# Patient Record
Sex: FEMALE | Race: WHITE
[De-identification: names, ages, dates, MRNs, and addresses within clinical notes are randomized per-mention and may not be internally consistent; named-entity substitution may affect disease eponyms.]

---

## 2019-11-26 ENCOUNTER — HOSPITAL ENCOUNTER (INPATIENT)
Dept: HOSPITAL 74 - JER | Age: 64
LOS: 3 days | Discharge: HOME | DRG: 271 | End: 2019-11-29
Attending: FAMILY MEDICINE | Admitting: INTERNAL MEDICINE
Payer: COMMERCIAL

## 2019-11-26 VITALS — BODY MASS INDEX: 33.9 KG/M2

## 2019-11-26 DIAGNOSIS — K76.89: ICD-10-CM

## 2019-11-26 DIAGNOSIS — F17.210: ICD-10-CM

## 2019-11-26 DIAGNOSIS — J90: ICD-10-CM

## 2019-11-26 DIAGNOSIS — N28.1: ICD-10-CM

## 2019-11-26 DIAGNOSIS — T78.40XA: ICD-10-CM

## 2019-11-26 DIAGNOSIS — J44.9: ICD-10-CM

## 2019-11-26 DIAGNOSIS — I31.4: Primary | ICD-10-CM

## 2019-11-26 DIAGNOSIS — Z85.828: ICD-10-CM

## 2019-11-26 DIAGNOSIS — R00.0: ICD-10-CM

## 2019-11-26 DIAGNOSIS — J98.11: ICD-10-CM

## 2019-11-26 DIAGNOSIS — R60.9: ICD-10-CM

## 2019-11-26 DIAGNOSIS — I31.3: ICD-10-CM

## 2019-11-26 LAB
ALBUMIN SERPL-MCNC: 3 G/DL (ref 3.4–5)
ALP SERPL-CCNC: 87 U/L (ref 45–117)
ALT SERPL-CCNC: 54 U/L (ref 13–61)
ANION GAP SERPL CALC-SCNC: 7 MMOL/L (ref 8–16)
APTT BLD: 27.9 SECONDS (ref 25.2–36.5)
AST SERPL-CCNC: 26 U/L (ref 15–37)
BASOPHILS # BLD: 1.2 % (ref 0–2)
BILIRUB SERPL-MCNC: 0.4 MG/DL (ref 0.2–1)
BUN SERPL-MCNC: 8.4 MG/DL (ref 7–18)
CALCIUM SERPL-MCNC: 8.6 MG/DL (ref 8.5–10.1)
CHLORIDE SERPL-SCNC: 100 MMOL/L (ref 98–107)
CO2 SERPL-SCNC: 26 MMOL/L (ref 21–32)
CREAT SERPL-MCNC: 0.5 MG/DL (ref 0.55–1.3)
DEPRECATED RDW RBC AUTO: 13.3 % (ref 11.6–15.6)
EOSINOPHIL # BLD: 1 % (ref 0–4.5)
GLUCOSE SERPL-MCNC: 105 MG/DL (ref 74–106)
HCT VFR BLD CALC: 36.8 % (ref 32.4–45.2)
HGB BLD-MCNC: 12.4 GM/DL (ref 10.7–15.3)
INR BLD: 1.34 (ref 0.83–1.09)
LYMPHOCYTES # BLD: 16.6 % (ref 8–40)
MCH RBC QN AUTO: 31.1 PG (ref 25.7–33.7)
MCHC RBC AUTO-ENTMCNC: 33.7 G/DL (ref 32–36)
MCV RBC: 92.4 FL (ref 80–96)
MONOCYTES # BLD AUTO: 9.6 % (ref 3.8–10.2)
NEUTROPHILS # BLD: 71.6 % (ref 42.8–82.8)
PLATELET # BLD AUTO: 285 K/MM3 (ref 134–434)
PMV BLD: 8.4 FL (ref 7.5–11.1)
POTASSIUM SERPLBLD-SCNC: 3.5 MMOL/L (ref 3.5–5.1)
PROT SERPL-MCNC: 5.7 G/DL (ref 6.4–8.2)
PT PNL PPP: 15.9 SEC (ref 9.7–13)
RBC # BLD AUTO: 3.99 M/MM3 (ref 3.6–5.2)
SODIUM SERPL-SCNC: 133 MMOL/L (ref 136–145)
WBC # BLD AUTO: 9.5 K/MM3 (ref 4–10)

## 2019-11-26 PROCEDURE — 0W9D00Z DRAINAGE OF PERICARDIAL CAVITY WITH DRAINAGE DEVICE, OPEN APPROACH: ICD-10-PCS | Performed by: THORACIC SURGERY (CARDIOTHORACIC VASCULAR SURGERY)

## 2019-11-26 NOTE — CON.CARD
Consult


Consult Specialty:: Cardiology


Referred by:: ER


Reason for Consultation:: pericardial effusion





- History of Present Illness


Chief Complaint: sinus congestion, sob


History of Present Illness: 


63 year old woman with a pmh of basal cell Ca came to ER with c/o sob, facial 

swelling, generalized itching runny nose, cough, sinus congestion for 3 days. 

she has been on amoxicillin at home. when she became itchy and had sob she 

became concerned for an allergic reaction to amoxicillin thus came to ER.


CTA chest done in ER showed large pericardial effusion and b/l pleural 

effusions.


Echo was done confirming large pericardial effusion with echocardiographic 

signs of tamponade.


pt currently comfortable, lying flat, nad. no current complaints. 





- History Source


History Provided By: Patient, Medical Record


Limitations to Obtaining History: No Limitations





- Smoking History


Smoking history: Unknown if ever smoked





Home Medications





- Allergies


Allergies/Adverse Reactions: 


 Allergies











Allergy/AdvReac Type Severity Reaction Status Date / Time


 


No Known Allergies Allergy   Verified 11/26/19 11:43














- Home Medications


Home Medications: 


Ambulatory Orders





Amoxicillin/Potassium Clav [Amox-Clav 875-125 mg Tablet] 1 mg PO BID 11/26/19 











Family Medical History


Family History: Denies





Review of Systems





- Review of Systems


Constitutional: denies: No Symptoms, Chills, Diaphoresis, Fever, Lethargy, Loss 

of Appetite, Malaise, Night Sweats, Unintentional Wgt. Loss, Weakness, Other


Eyes: denies: No Symptoms, Blind Spots, Blurred Vision, Double Vision, Eye Pain

, Floaters, Photophobia, Recent Change in Vision, Other


HENT: reports: Nasal Congestion, Throat Pain.  denies: No Symptoms, Difficult 

Swallowing, Ear Discharge, Ear Pain, Epistaxis, Gingival Bleeding, Hearing Loss

, Mouth Swelling, Ocular Prosthesis, Toothache, Ringing in Ears, Other


Neck: denies: No Symptoms, Decreased ROM, Lumps, Pain on Movement, Stiffness, 

Swollen Glands, Tenderness, Other


Cardiovascular: reports: Shortness of Breath.  denies: No Symptoms, Chest Pain, 

Edema, Palpitations, Other


Respiratory: reports: Cough, SOB, SOB on Exertion.  denies: No Symptoms, 

Exercise Intolerance, Hemoptysis, Orthopnea, PND, Snoring, Wheezing, Other


Gastrointestinal: denies: No Symptoms, Abdominal Pain, Bloating, Constipation, 

Diarrhea, Dysphagia, Indigestion, Melena, Nausea, Rectal Bleeding, Vomiting, 

Vomiting Blood, Other


Genitourinary: denies: No Symptoms, Burning, Discharge, Dysuria, Flank Pain, 

Frequency, Hematuria, Incontinence, Lesions, Menses, Pain, Testicular Mass, 

Testicular Pain, Testicular Swelling, Urgency, Vaginal Bleeding, Other


Breasts: denies: No Symptoms Reported, See HPI, Breast Implants, Discharge from 

Nipple, Lumps, Pain, Skin Changes, Other


Musculoskeletal: denies: No Symptoms, Back Pain, Crepitus, Decreased ROM, 

Extremity Pain, Joint Pain, Joint Swelling, Muscle Pain, Muscle Cramps, Muscle 

Weakness, Other


Integumentary: denies: No Symptoms, Blister, Bruising, Change in Color, Eczema, 

Erythema, Incision, Lesions, Lump, Pallor, Pruritis, Rash, Wound, Other


Neurological: denies: No Symptoms, Change in LOC, Change in Speech, Confusion, 

Dizziness, Headache, Incoordination, Numbness, Parasthesia, Pre-Existing Deficit

, Seizure, Syncope, Tremors, Unsteady Gait, Weakness, Other


Endocrine: denies: No Symptoms, Excessive Sweating, Flushing, Increased Hunger, 

Increased Thirst, Intolerance to Cold, Intolerance to Heat, Unexplained Weight 

Gain, Unexplained Weight Loss, Other


Hematology/Lymphatic: denies: No Symptoms, Easily Bruised, Excessive Bleeding, 

Swollen Glands, Other


Psychiatric: denies: No Symptoms, Altered Sleep Pattern, Anxiety, Depression, 

Hallucinations, Panic, Paranoia, Suicidal, Other


Vital Signs: 


 Vital Signs











Temperature  98.2 F   11/26/19 11:31


 


Pulse Rate  117 H  11/26/19 11:31


 


Respiratory Rate  18   11/26/19 11:31


 


Blood Pressure  127/81   11/26/19 11:31


 


O2 Sat by Pulse Oximetry (%)  95   11/26/19 11:31











Constitutional: Yes: No Distress, Calm


Eyes: Yes: Conjunctiva Clear, EOM Intact


HENT: Yes: Atraumatic, Normocephalic


Neck: Yes: Supple, Trachea Midline


Respiratory: Yes: Regular, Diminished.  No: Rales, Rhonchi, SOB, Wheezes


Gastrointestinal: Yes: Normal Bowel Sounds, Soft.  No: Distention, Tenderness


Cardiovascular: Yes: Tachycardia.  No: Bradycardia, Pulse Irregular, Gallop, Rub

, Varicosities


JVD: No


Carotid Bruit: No


PMI: Non-Displaced


Heart Sounds: Yes: S1, S2.  No: Split S2, S3, S4, Clicks, Gallop, Rub, Bruit


Murmur: No: Systolic Murmur, Diastolic Murmur


Musculoskeletal: Yes: WNL


Extremities: Yes: WNL


Edema: No


Peripheral Pulses WNL: Yes


Peripheral Pulses: 2+ Left Doralis Pedis, 2+ Right Dorsalis Pedis


Neurological: Yes: Alert, Oriented


Psychiatric: Yes: Alert, Oriented





- Other Data


Labs, Other Data: 


 CBC, BMP





 11/26/19 12:44 





 11/26/19 12:44 








sinus tach





Echo: Report Reviewed, Image Reviewed





Imaging





- Results


Chest X-ray: Report Reviewed, Image Reviewed


EKG: Report Reviewed, Image Reviewed


Other: Report Reviewed, Image Reviewed





Assessment/Plan


63 year old woman with a pmh of basal cell Ca came to ER with c/o sob, facial 

swelling, generalized itching runny nose, cough, sinus congestion for 3 days. 

she has been on amoxicillin at home. when she became itchy and had sob she 

became concerned for an allergic reaction to amoxicillin thus came to ER.


CTA chest done in ER showed large pericardial effusion and b/l pleural 

effusions.


Echo was done confirming large pericardial effusion with echocardiographic 

signs of tamponade.


pt currently comfortable, lying flat, nad. no current complaints. 





Pericardial effusion-with evidence of tamponade


-BP stable


-mildly symptomatic


-ekg sinus tach


-thoracic surgery and IR to be contacted for pericardiocentesis


-if unable to be performed here today recommend transfer to tertiary care 

center for either pericardiocentesis today or closer monitoring overnight for 

procedure tomorrow.


-discussed with pt transfer to [facility] but she declined stating that her insurance 

is not accepted there.


-avoid diuretics


-maintain adequate intravascular volume


-no anti-htn or AV sandra blocker meds

## 2019-11-26 NOTE — PDOC
History of Present Illness





- General


Chief Complaint: Allergic Reaction


Stated Complaint: Allergic Reaction


Time Seen by Provider: 11/26/19 12:06





- History of Present Illness


Initial Comments: 





Ms. Barron is a 64 y/o female with PMH of basal cell carcinoma presenting today 

with difficulty breathing, facial swelling, and generalized itchiness over the 

past 3 days. Reports that she has been feeling sick since Saturday (runny nose, 

cough) and was diagnosed with sinusitis and given amoxicillin. She has taken 

amoxicillin since Sunday. Reports that since Sunday she has felt short of 

breath and felt her cheeks swell and generalized itchiness. She does have 

eczema. After the itchiness and difficulty breathing did not improve, she was 

concerned about an allergic reaction and presented to the ED. 





Denies chest pain. Denies throat swelling. Denies tongue swelling. Denies 

abdominal pain.





SocHx: 20 pack year smoking hx








Past History





- Past Medical History


Allergies/Adverse Reactions: 


 Allergies











Allergy/AdvReac Type Severity Reaction Status Date / Time


 


amoxicillin Allergy   Verified 11/26/19 19:31











Home Medications: 


Ambulatory Orders





Amoxicillin/Potassium Clav [Amox-Clav 875-125 mg Tablet] 1 mg PO BID 11/26/19 








COPD: No


Other medical history: SKIN CANCE WITH MULTIPLE MOHL'S PROCEDURES





- Psycho Social/Smoking Cessation Hx


Smoking History: Unknown if ever smoked


Drug/Substance Use Hx: No





**Review of Systems





- Review of Systems


Comments:: 





GENERAL/CONSTITUTIONAL: No fever or chills. No weakness._


HEAD, EYES, EARS, NOSE AND THROAT: No change in vision. No change in hearing. 

No sore throat. Reports swelling of the cheeks.


CARDIOVASCULAR: No chest pain. Reports difficulty breathing. 


RESPIRATORY: Reports cough. No hemoptysis_


GASTROINTESTINAL: No nausea, vomiting, diarrhea or constipation._


GENITOURINARY: No dysuria, frequency, or change in urination._


MUSCULOSKELETAL: No joint or muscle swelling or pain. No neck or back pain._


SKIN: Reports diffuse itching and dry skin. 


NEUROLOGIC: No headache, vertigo, loss of consciousness, or change in strength/

sensation._


ENDOCRINE: No increased thirst. No abnormal weight change_


HEMATOLOGIC/LYMPHATIC: No anemia, easy bleeding, or history of blood clots._








*Physical Exam





- Vital Signs


 Last Vital Signs











Temp Pulse Resp BP Pulse Ox


 


 98.2 F   117 H  18   127/81   95 


 


 11/26/19 11:31  11/26/19 11:31  11/26/19 11:31  11/26/19 11:31  11/26/19 11:31














- Physical Exam


Comments: 





GENERAL: Awake, alert, and oriented to person/place/time, in no acute distress_


HEAD: No signs of trauma, normocephalic, atraumatic _


EYES: PERRLA, EOMI, sclera anicteric, conjunctiva clear_


ENT: Hearing grossly normal, nares patent, oropharynx clear without exudates. 

No uvular deviation. Moist mucosa. Tongue and oral cavity do not appear 

swollen. 


NECK: Normal ROM, supple, no lymphadenopathy, JVD, or masses_


LUNGS: No distress, speaks in full sentences, clear to auscultation bilaterally 

_


HEART: Regular rate and rhythm, normal S1 and S2, no murmurs appreciated, 

peripheral pulses normal and equal bilaterally._


ABDOMEN: Soft, nontender, normoactive bowel sounds. No guarding, no rebound. No 

masses_


EXTREMITIES: Normal inspection, Normal range of motion. Mild 1+ pitting edema 

in lower extremities. No clubbing or cyanosis_


NEUROLOGICAL: Cranial nerves II through XII grossly intact. Normal speech, 

normal gait, no focal sensorimotor deficits _


SKIN: Warm, Dry, normal turgor. Dry skin. No rash or hives noted. 











ED Treatment Course





- LABORATORY


CBC & Chemistry Diagram: 


 11/26/19 12:44





 11/26/19 12:44





Medical Decision Making





- Medical Decision Making





11/26/19 12:30


63F presenting with facial swelling and difficulty breathing over the past 3 

days. Given amoxicillin for sinusitis. Benadryl given by EMS. 


DDx includes allergic reaction vs sinusitis vs viral URI.


-cbc, cmp


-cxr


-duonebs, solumedrol 





11/26/19 14:41


Labs reviewed. WBC wnl. 


CXR shows left pleural effusion.


-CTA chest to r/o PE vs PNA





11/26/19 16:03


CTA shows large pericardial effusion. VSS.


-echo to r/o cardiac tamponade 


-cards consult 





11/26/19 17:08


Echo shows large pericardial effusion with evidence of cardiac tamponade 


EKG shows sinus tachycardia, 113 bpm, low voltage, QTc 400. 


D/w Dr. Hakami from thoracic surgery, who will place a drain tonight. 


Pt reassessed. /87.


-2L NS


-T+S, coags, trop, BNP





11/26/19 17:00


Labs reviewed. 


 Laboratory Tests











  11/26/19 11/26/19 11/26/19





  12:44 12:44 16:40


 


WBC  9.5  


 


RBC  3.99  


 


Hgb  12.4  


 


Hct  36.8  


 


MCV  92.4  


 


MCH  31.1  


 


MCHC  33.7  


 


RDW  13.3  


 


Plt Count  285  


 


MPV  8.4  


 


Absolute Neuts (auto)  6.8  


 


Neutrophils %  71.6  


 


Lymphocytes %  16.6  


 


Monocytes %  9.6  


 


Eosinophils %  1.0  


 


Basophils %  1.2  


 


Nucleated RBC %  0  


 


PT with INR   


 


INR   


 


PTT (Actin FS)   


 


Sodium   133 L 


 


Potassium   3.5 


 


Chloride   100 


 


Carbon Dioxide   26 


 


Anion Gap   7 L 


 


BUN   8.4 


 


Creatinine   0.5 L 


 


Est GFR (CKD-EPI)AfAm   118.54 


 


Est GFR (CKD-EPI)NonAf   102.28 


 


Random Glucose   105 


 


Calcium   8.6 


 


Total Bilirubin   0.4 


 


AST   26 


 


ALT   54 


 


Alkaline Phosphatase   87 


 


Troponin I   


 


B-Natriuretic Peptide    436.4 H


 


Total Protein   5.7 L 


 


Albumin   3.0 L 


 


Blood Type   


 


Antibody Screen   














  11/26/19 11/26/19 11/26/19





  16:40 16:40 16:40


 


WBC   


 


RBC   


 


Hgb   


 


Hct   


 


MCV   


 


MCH   


 


MCHC   


 


RDW   


 


Plt Count   


 


MPV   


 


Absolute Neuts (auto)   


 


Neutrophils %   


 


Lymphocytes %   


 


Monocytes %   


 


Eosinophils %   


 


Basophils %   


 


Nucleated RBC %   


 


PT with INR  15.90 H  


 


INR  1.34 H  


 


PTT (Actin FS)  27.9  


 


Sodium   


 


Potassium   


 


Chloride   


 


Carbon Dioxide   


 


Anion Gap   


 


BUN   


 


Creatinine   


 


Est GFR (CKD-EPI)AfAm   


 


Est GFR (CKD-EPI)NonAf   


 


Random Glucose   


 


Calcium   


 


Total Bilirubin   


 


AST   


 


ALT   


 


Alkaline Phosphatase   


 


Troponin I   < 0.02 


 


B-Natriuretic Peptide   


 


Total Protein   


 


Albumin   


 


Blood Type    O POSITIVE


 


Antibody Screen    Negative














11/26/19 18:23


D/w hospitalist team who accepts the patient for admission to the ICU after 

procedure. 














Discharge





- Discharge Information


Problems reviewed: Yes


Clinical Impression/Diagnosis: 


 Cardiac tamponade





Condition: Guarded





- Admission


Yes





- Follow up/Referral





- Patient Discharge Instructions





- Post Discharge Activity

## 2019-11-26 NOTE — ECHO
______________________________________________________________________________



Name: SANDER RAHMAN                                   Exam:Adult Echocardiogram

MRN: [id number]         Study Date: 11/26/2019 03:54 PM

Age: 63 yrs

______________________________________________________________________________



Reason For Study: R/O Tamponade Large Effusion

Height: 66 in        Weight: 160 lb        BSA: 1.8 m2



______________________________________________________________________________





______________________________________________________________________________

Procedure

The study was technically limited with all images being suboptimal in quality. A two-dimensional ge
sthoracic

echocardiogram with color flow and Doppler was performed in limited views only. No measurements obtai
gage.

Left Ventricle

The left ventricle is grossly normal size. Left ventricular systolic function is grossly normal.

Right Ventricle

The right ventricle is not well visualized. RV collapse in diastole noted.

Atria

RA collapse in diastole noted.

Mitral Valve

The mitral valve is not well visualized.

Tricuspid Valve

The tricuspid valve is not well visualized.

Aortic Valve

The aortic valve is not well visualized.

Pulmonic Valve

The pulmonic valve is not well visualized.

Great Vessels

The aortic root is not well visualized.

Pericardium/Pleura

Large pericardial effusion (>2 cm). The diastolic compression of the right ventricle is suggestive of
 cardiac

tamponade.



______________________________________________________________________________



Interpretation Summary

The study was technically limited with all images being suboptimal in quality. A two-dimensional ge
sthoracic

echocardiogram with color flow and Doppler was performed in limited views only. No measurements obtai
gage.



The left ventricle is grossly normal size. Left ventricular systolic function is grossly normal.

RV collapse in diastole noted. RA collapse in diastole noted.

Large pericardial effusion (>2 cm) The diastolic compression of the right ventricle is suggestive of 
cardiac

tamponade.





MD Tanesha Sewell 11/26/2019 04:43 PM

## 2019-11-26 NOTE — CONSULT
Consult


Consult Specialty:: thoracic





- History of Present Illness


Chief Complaint: shortness of breath


History of Present Illness: 





2 weeks history of malaise and sob. was admitted to hospital where CT chest and 

TTE revealed large pericardial effusion and tamponade.





- History Source


History Provided By: Patient





- Past Medical History


CNS: No: Alzheimer's, CVA, Dementia, Migraine, Multiple Sclerosis, Peripheral 

Neuropathy, Parkinson's, Seizure, Syncope, TIA, Vertigo, Other


Cardio/Vascular: No: AFIB, Aneurysm, Aortic Insufficiency, Aortic Stenosis, CAD

, CHF, Deep Vein Thrombosis, HTN, Hyperlipdemia, MI, Mitral Insufficiency, 

Mitral Stenosis, Murmur, Pulmonary Hypertension, Other


Gastrointestinal: No: Ascites, Cancer, Constipation, Crohn's Disease, 

Diverticulitis, Diverticulosis, Esophageal Varices, Gastritis, GERD, GI Bleed, 

Hemorrhoids, Hiatal Hernia, Inflamatory Bowel Disease, Irritable Bowel Disease, 

Pancreatitis, Peptic Ulcer Disease, Ulcerative Colitis, Other


Hepatobiliary: No: Cirrhosis, Cholelithiasis, Cholecystitis, Choledocholithiasis

, Hepatitis A, Hepatitis B, Hepatitis C, Other


Rheumatology: Yes: Rheumatoid Arthritis





- Past Surgical History


Additional Surgical History: removal of skin cancer from face





- Alcohol/Substance Use


Hx Alcohol Use: No





- Smoking History


Smoking history: Never smoked





Home Medications





- Allergies


Allergies/Adverse Reactions: 


 Allergies











Allergy/AdvReac Type Severity Reaction Status Date / Time


 


amoxicillin Allergy   Verified 11/26/19 19:31














- Home Medications


Home Medications: 


Ambulatory Orders





Amoxicillin/Potassium Clav [Amox-Clav 875-125 mg Tablet] 1 mg PO BID 11/26/19 











Family Medical History


Family History: Unremarkable





Review of Systems





- Review of Systems


Constitutional: reports: Lethargy


Eyes: reports: No Symptoms


HENT: denies: No Symptoms, Difficult Swallowing, Ear Discharge, Ear Pain, 

Epistaxis, Gingival Bleeding, Hearing Loss, Mouth Swelling, Nasal Congestion, 

Ocular Prosthesis, Throat Pain, Toothache, Ringing in Ears, Other


Neck: denies: No Symptoms, Decreased ROM, Lumps, Pain on Movement, Stiffness, 

Swollen Glands, Tenderness, Other


Respiratory: reports: Cough, SOB


Genitourinary: denies: No Symptoms, Burning, Discharge, Dysuria, Flank Pain, 

Frequency, Hematuria, Incontinence, Lesions, Menses, Pain, Testicular Mass, 

Testicular Pain, Testicular Swelling, Urgency, Vaginal Bleeding, Other


Breasts: denies: No Symptoms Reported, See HPI, Breast Implants, Discharge from 

Nipple, Lumps, Pain, Skin Changes, Other


Musculoskeletal: denies: No Symptoms, Back Pain, Crepitus, Decreased ROM, 

Extremity Pain, Joint Pain, Joint Swelling, Muscle Pain, Muscle Cramps, Muscle 

Weakness, Other


Integumentary: denies: No Symptoms, Blister, Bruising, Change in Color, Eczema, 

Erythema, Incision, Lesions, Lump, Pallor, Pruritis, Rash, Wound, Other


Neurological: denies: No Symptoms, Change in LOC, Change in Speech, Confusion, 

Dizziness, Headache, Incoordination, Numbness, Parasthesia, Pre-Existing Deficit

, Seizure, Syncope, Tremors, Unsteady Gait, Weakness, Other





Physical Exam


Vital Signs: 


 Vital Signs











Temperature  97.6 F   11/26/19 22:20


 


Pulse Rate  108 H  11/26/19 23:05


 


Respiratory Rate  16   11/26/19 23:05


 


Blood Pressure  117/68   11/26/19 23:05


 


O2 Sat by Pulse Oximetry (%)  94 L  11/26/19 23:05











Constitutional: Yes: Well Nourished


Eyes: Yes: WNL


HENT: Yes: WNL


Neck: Yes: Supple, Trachea Midline


Respiratory: Yes: Cough, Tachypnea


Peripheral Pulses WNL: No


Labs: 


 CBC, BMP





 11/26/19 12:44 





 11/26/19 12:44 











Imaging





- Results


Chest X-ray: Report Reviewed


Cat Scan: Report Reviewed (TTE reviewed)





Problem List





- Problems


(1) Cardiac tamponade


Code(s): I31.4 - CARDIAC TAMPONADE   





(2) Pericardial effusion


Code(s): I31.3 - PERICARDIAL EFFUSION (NONINFLAMMATORY)   





Assessment/Plan





63 y/o F with pericardial effusion and tamponade sign on TTE  was admitted to 

the hospital' There is indication for emergency subxiphoid pericardial window.


Patient was informed of risks, benefits and alternative treatment options and 

consented for surgery.

## 2019-11-26 NOTE — PDOC
Documentation entered by Joaquin Carrizales SCRIBE, acting as scribe for Randy Domingo MD.








Randy Domingo MD:  This documentation has been prepared by the All king Nirvannie, SCRIBE, under my direction and personally reviewed by me in its 

entirety.  I confirm that the documentation accurately reflects all work, 

treatment, procedures, and medical decision making performed by me.  





Attending Attestation





- Resident


Resident Name: Marin Sarmiento





- ED Attending Attestation


I have performed the following: I have examined & evaluated the patient, The 

case was reviewed & discussed with the resident, I agree w/resident's findings 

& plan, Exceptions are as noted





- HPI


HPI: 





11/26/19 13:31


CC: Difficulty swelling, generalized itchiness, and facial swelling.


 


HPI:





The patient is a 63 year old female, with a significant past medical history of 

basal cell carcinoma, who presents to the emergency department with, 3 days of 

difficulty sinusitis rhinorrhea nasal congestion cough and shortness of breath 

was seen in urgent care was prescribed amoxicillin today developed itching with 

no significant improvement in her other symptoms and presents to the ED for 

further management





Symptoms are moderate persistent constant with no exacerbating relieving 

factors.  Her blood pressure upon arrival to the emergency department was 

within normal limits she was slightly tachycardic


She denies any tongue/throat swelling or chest pain. 





Allergies: NKDA 


Primary Care Physician: Dr. Shaffer





11/26/19 17:30








- Physicial Exam


PE: 





11/26/19 17:31








Vitals: Triage Vital signs reviewed


General Appearance: No acute distress, well nourished well developed, 


Head: Atraumatic, 


Cardiac: Tachycardic


Lungs: Clear to auscultation bilateral, good air movement bilaterally,


Abdomen: Soft, non distended, normal bowel sounds, non tender to palpation


Genitourinary:


Rectal: Exam deferred


Extremities: Full range of motion to all extremities, no cyanosis, clubbing, or 

edema


Skin: Warm and dry, no rashes or lesions, no rash, no petechiae


Neuro: AOX3; cranial Nerves 2-12 grossly intact, strength intact to all 

extremities, sensation intact to all extremities, gait normal


Psych: Normal mood, normal affect








- Critical Care Time


Total Critical Care Time: 65


Critical Care Statement: The care of this patient involved high complexity 

decision making to prevent further life threatening deterioration of the patient

's condition and/or to evaluate & treat vital organ system(s) failure or risk 

of failure.





- Medical Decision Making





11/26/19 18:08


63 years old no significant past medical history active tobacco but decreasing 

usage presents to the ED with 3-day history of sinus pain congestion started on 

amoxicillin on Friday with mild itching today no rash consistent with mild 

allergic reaction





Chest x-ray ordered patient treated with Solu-Medrol duo nebs will observe and 

reassess





Reevaluation patient feels much better after Solu-Medrol and duo nebs her chest 

x-ray demonstrated a left pleural effusion no previous to compare previous 

chart review performed





Given tachycardia and pleural effusion we will perform a CTA to better 

delineate etiology of effusion and rule out PE





Reevaluation CTA demonstrates large pericardial effusion repeat vitals checked 

patient systolic blood pressure greater than 120





Cardiology consulted stat echo ordered





Echo demonstrated large pericardial effusion with evidence of tamponade 

physiology





EKG demonstrates low voltage 2 L normal saline ordered





Case discussed with  Thoracic surgery (interventional not available)





Plan is for drain tonight in the OR patient has been n.p.o. since yesterday not 

on any blood thinners





No past medical history medically clear for drain











**Heart Score/ECG Review





- ECG Impressions


Comment:: 





11/26/19 18:08


Low voltage EKG





Interpreted by me.

## 2019-11-26 NOTE — CONSULT
Consultation: 


REQUESTING PROVIDER:





CONSULT REQUEST: We have been asked to medically evaluate this patient for (

specify).





HISTORY OF PRESENT ILLNESS:


This is a 63 year old female with PMH significant for basal cell CA. She 

presented to the ER with complaints of diffuse swelling of her hands, arms, face

, and legs associated with difficulty breathing and nasal congestion for the 

past 3 days. She visited an urgent care clinic 3 days ago for nasal congestion, 

which she attributed to seasonal allergies. She was prescribed Amoxicillin 

twice a day, and has had a total of 5 doses so far. She has no history of 

penicillin allergy. She was diagnosed with bronchitis in mid September, and 

took antibiotics twice a day for 10 days, although she does not remember which 

antibiotics she took.





She does not complain of any fevers, chills, nausea, vomiting, diarrhea, 

constipation, chest pain, palpitations, dysuria, polyuria, hematuria,  





She occasionally takes antihistamined for seasonal allergies, but does not take 

any regular medication. She had back surgery 47 years ago for I&D of an 

abscess. She smokes 3 cigarettes per day, down from 15 cigarettes per day for 

the past 40 years. She drinks alcohol socially, and does not use recreational 

drugs.





CTA Chest was performed to r/o PE, and an enlarged heart with significant 

pericardial effusion. Echo was performed which confirmed tamponade, pending 

pericardiocentisis followed by transfer to ICU for monitoring. 








REVIEW OF SYSTEMS:


CONSTITUTIONAL: 


Absent:  fever, chills, diaphoresis, generalized weakness, malaise, loss of 

appetite, weight change


HEENT: 


Absent:  rhinorrhea, nasal congestion, throat pain, throat swelling, difficulty 

swallowing, mouth swelling, ear pain, eye pain, visual changes


CARDIOVASCULAR: 


Absent: chest pain, syncope, palpitations, irregular heart rate, lightheadedness

, peripheral edema


RESPIRATORY: SOB


Absent: cough, dyspnea with exertion, orthopnea, wheezing, stridor, hemoptysis


GASTROINTESTINAL:


Absent: abdominal pain, abdominal distension, nausea, vomiting, diarrhea, 

constipation, melena, hematochezia


GENITOURINARY: 


Absent: dysuria, frequency, urgency, hesitancy, hematuria, flank pain, genital 

pain


MUSCULOSKELETAL: 


Absent: myalgia, arthralgia, joint swelling, back pain, neck pain


SKIN: 


Absent: rash, itching, pallor


HEMATOLOGIC/IMMUNOLOGIC: 


Absent: easy bleeding, easy bruising, lymphadenopathy, frequent infections


ENDOCRINE:


Absent: unexplained weight gain, unexplained weight loss, heat intolerance, 

cold intolerance


NEUROLOGIC: 


Absent: headache, focal weakness or paresthesias, dizziness, unsteady gait, 

seizure, mental status changes, bladder or bowel incontinence


PSYCHIATRIC: 


Absent: anxiety, depression, suicidal or homicidal ideation, hallucinations.





PHYSICAL EXAMINATION





 Last Vital Signs











Temp Pulse Resp BP Pulse Ox


 


 97.4 F L  107 H  20   110/81   96 


 


 11/26/19 15:43  11/26/19 17:58  11/26/19 17:58  11/26/19 17:58  11/26/19 15:43

















GENERAL: AOx3


HEAD: Normal with no signs of trauma.


EYES: JOELLEN, EOMI


EARS, NOSE, THROAT: Ears normal, nares patent, oropharynx clear without 

exudates. Moist mucous membranes.


LUNGS: Breath sounds equal, clear to auscultation bilaterally. No wheezes, and 

no crackles. No accessory muscle use.


HEART: RRR, diminished heart sounds, no rubs


ABDOMEN: Soft, nontender, not distended, normoactive bowel sounds, no guarding, 

no rebound, no masses.  No hepatomegaly or splenomegaly. 


LOWER EXTREMITIES: 2+ pulses, no edema, no calf tenderness


NEUROLOGICAL:  Cranial nerves II-XII intact. Normal speech. Normal gait.











 CBC, BMP





 11/26/19 12:44 





 11/26/19 12:44 











ASSESSMENT/PLAN:


This is a 63 year old female with PMH significant for basal cell CA. She 

presented to the ER with complaints of diffuse swelling of her hands, arms, face

, and legs associated with difficulty breathing and nasal congestion for the 

past 3 days. CTA Chest was performed to r/o PE, and an enlarged heart with 

significant pericardial effusion. Echo was performed which confirmed tamponade, 

pending pericardiocentisis followed by transfer to ICU for monitoring. 





#Neuro


- AOx3





#CVS


- CT: Large pericardial effusion, B/L pleural effusions with lower lobe 

atelectasis, moderate COPD, multiple hepatic/renal cysts


- Echo: Large pericardial effusion, (>2cm), diastolic compression  of RV 

collapse due to tamponade, LV size and function normal


- .4





#Respiratory


- Not complaining of SOB


- Developed a productive cough over night, CXR ordered for AM, will check for 

fluid overload





#GI


- Will advance diet





 #Renal


- BUN/Cr 8.4/0.5





#FEN


- LR @ 125


- Na 133


- NPO





#Prophylaxis


- SCDs





#Dispo


- Will monitor in ICU


- We will continue to follow the patient. Thank you for this consultative 

opportunity.











ATTENDING PHYSICIAN STATEMENT





I saw and evaluated the patient.


I reviewed the resident's note and discussed the case with the resident.


I agree with the resident's findings and plan as documented.








SUBJECTIVE:








OBJECTIVE:








ASSESSMENT AND PLAN:

## 2019-11-26 NOTE — HP
Admitting History and Physical





- Primary Care Physician


PCP: Janay Shaffer





- Admission


Chief Complaint: Cough, SOB, Generalized Swelling, Rash, Pruritus


History of Present Illness: 





This is a 63 year old woman with a PMHx of Basal Cell Ca, Tobacco Smoker. Who 

presents to the ED with SOB x 4 days, facial swelling, generalized itching, 

runny nose, cough, sinus congestion for 3 days. She has been on Amoxicillin at 

home (from visit to Urgent Care last Sunday), when she developed pruritus, 

erythematous macular, papular rash and was SOB. She became concerned for an 

Allergic Reaction to Amoxicillin prompting her to seek evaluation in the ED. 

Patient was taken to the OR with Dr. Sanders- Pericardiocentesis





ED course was noted for:





(1) CTA chest done in ED- showed large Pericardial Effusion and b/l Pleural 

Effusions.


(2) Echo was done confirming large pericardial effusion with echocardiographic 

signs of Tamponade.





History Source: Patient


Limitations to Obtaining History: No Limitations





- Past Medical History


Dermatology: Yes: Basal Cell





- Past Surgical History


Additional Past Surgical History: 





Lumbar- I&D Abscess





- Smoking History


Smoking history: Current every day smoker


Aproximately how many cigarettes per day: 3





- Alcohol/Substance Use


Hx Alcohol Use: Yes (Occasional)


History of Substance Use: reports: None





- Social History


Usual Living Arrangement: Yes: Alone


ADL: Independent


History of Recent Travel: No





Home Medications





- Allergies


Allergies/Adverse Reactions: 


 Allergies











Allergy/AdvReac Type Severity Reaction Status Date / Time


 


amoxicillin Allergy   Verified 11/26/19 19:31














- Home Medications


Home Medications: 


Ambulatory Orders





Amoxicillin/Potassium Clav [Amox-Clav 875-125 mg Tablet] 1 mg PO BID 11/26/19 











Family Medical History


Family History: Unable to Obtain





Review of Systems





- Review of Systems


Constitutional: reports: Other (generalized body swelling)


Eyes: reports: No Symptoms


HENT: reports: Nasal Congestion


Neck: reports: No Symptoms


Cardiovascular: reports: Shortness of Breath


Respiratory: reports: Cough, SOB, SOB on Exertion, Wheezing


Gastrointestinal: reports: No Symptoms


Genitourinary: reports: No Symptoms


Breasts: reports: No Symptoms Reported


Musculoskeletal: reports: No Symptoms


Integumentary: reports: Erythema, Pruritis, Rash


Neurological: reports: No Symptoms


Endocrine: reports: No Symptoms


Hematology/Lymphatic: reports: No Symptoms


Psychiatric: reports: No Symptoms





Physical Examination


Vital Signs: 


 Vital Signs











Temperature  97.4 F L  11/26/19 15:43


 


Pulse Rate  107 H  11/26/19 17:58


 


Respiratory Rate  20   11/26/19 17:58


 


Blood Pressure  110/81   11/26/19 17:58


 


O2 Sat by Pulse Oximetry (%)  96   11/26/19 15:43











Constitutional: Yes: No Distress, Calm


Eyes: Yes: WNL, Conjunctiva Clear, EOM Intact, PERRL


HENT: Yes: WNL, Atraumatic, Normocephalic


Neck: Yes: WNL, Supple, Trachea Midline


Cardiovascular: Yes: Tachycardia, S1, S2


Respiratory: Yes: Diminished


Gastrointestinal: Yes: WNL, Normal Bowel Sounds, Soft


Renal/: Yes: WNL


Breast(s): Yes: WNL


Musculoskeletal: Yes: WNL


Extremities: Yes: WNL


Edema: No


Peripheral Pulses WNL: Yes


Neurological: Yes: WNL, Alert, Oriented, Cran Nerves II-XII Intact


...Motor Strength: WNL


Psychiatric: Yes: WNL, Alert, Oriented


Labs: 


 CBC, BMP





 11/26/19 12:44 





 11/26/19 12:44 





 Laboratory Results - last 24 hr











  11/26/19 11/26/19 11/26/19





  12:44 12:44 16:40


 


WBC  9.5  


 


RBC  3.99  


 


Hgb  12.4  


 


Hct  36.8  


 


MCV  92.4  


 


MCH  31.1  


 


MCHC  33.7  


 


RDW  13.3  


 


Plt Count  285  


 


MPV  8.4  


 


Absolute Neuts (auto)  6.8  


 


Neutrophils %  71.6  


 


Lymphocytes %  16.6  


 


Monocytes %  9.6  


 


Eosinophils %  1.0  


 


Basophils %  1.2  


 


Nucleated RBC %  0  


 


PT with INR   


 


INR   


 


PTT (Actin FS)   


 


Sodium   133 L 


 


Potassium   3.5 


 


Chloride   100 


 


Carbon Dioxide   26 


 


Anion Gap   7 L 


 


BUN   8.4 


 


Creatinine   0.5 L 


 


Est GFR (CKD-EPI)AfAm   118.54 


 


Est GFR (CKD-EPI)NonAf   102.28 


 


Random Glucose   105 


 


Calcium   8.6 


 


Phosphorus   


 


Magnesium   


 


Total Bilirubin   0.4 


 


AST   26 


 


ALT   54 


 


Alkaline Phosphatase   87 


 


Troponin I   


 


B-Natriuretic Peptide    436.4 H


 


Total Protein   5.7 L 


 


Albumin   3.0 L 


 


Blood Type   


 


Antibody Screen   














  11/26/19 11/26/19 11/26/19





  16:40 16:40 16:40


 


WBC   


 


RBC   


 


Hgb   


 


Hct   


 


MCV   


 


MCH   


 


MCHC   


 


RDW   


 


Plt Count   


 


MPV   


 


Absolute Neuts (auto)   


 


Neutrophils %   


 


Lymphocytes %   


 


Monocytes %   


 


Eosinophils %   


 


Basophils %   


 


Nucleated RBC %   


 


PT with INR  15.90 H  


 


INR  1.34 H  


 


PTT (Actin FS)  27.9  


 


Sodium   


 


Potassium   


 


Chloride   


 


Carbon Dioxide   


 


Anion Gap   


 


BUN   


 


Creatinine   


 


Est GFR (CKD-EPI)AfAm   


 


Est GFR (CKD-EPI)NonAf   


 


Random Glucose   


 


Calcium   


 


Phosphorus   


 


Magnesium   


 


Total Bilirubin   


 


AST   


 


ALT   


 


Alkaline Phosphatase   


 


Troponin I   < 0.02 


 


B-Natriuretic Peptide   


 


Total Protein   


 


Albumin   


 


Blood Type    O POSITIVE


 


Antibody Screen    Negative














  11/27/19 11/27/19 11/27/19





  05:30 05:30 05:30


 


WBC   10.9 H 


 


RBC   3.77 


 


Hgb   11.8 


 


Hct   34.9 


 


MCV   92.5 


 


MCH   31.4 


 


MCHC   33.9 


 


RDW   13.3 


 


Plt Count   269 


 


MPV   8.5 


 


Absolute Neuts (auto)   9.1 H 


 


Neutrophils %   83.6 H 


 


Lymphocytes %   8.2  D 


 


Monocytes %   7.9 


 


Eosinophils %   0.0  D 


 


Basophils %   0.3 


 


Nucleated RBC %   0 


 


PT with INR   


 


INR   


 


PTT (Actin FS)   


 


Sodium    140


 


Potassium    3.7


 


Chloride    108 H


 


Carbon Dioxide    27


 


Anion Gap    6 L


 


BUN    6.0 L


 


Creatinine    0.4 L


 


Est GFR (CKD-EPI)AfAm    126.68


 


Est GFR (CKD-EPI)NonAf    109.30


 


Random Glucose    123 H


 


Calcium    8.2 L


 


Phosphorus    3.7


 


Magnesium    2.1


 


Total Bilirubin    0.4


 


AST    15


 


ALT    45


 


Alkaline Phosphatase    74


 


Troponin I   


 


B-Natriuretic Peptide   


 


Total Protein    5.0 L


 


Albumin    2.6 L


 


Blood Type  O POSITIVE  


 


Antibody Screen   








 Current Medications








Imaging





- Results


Chest X-ray: Report Reviewed, Image Reviewed


Cat Scan: Report Reviewed, Image Reviewed


EKG: Image Reviewed


Other: Other (Echo)





Problem List





- Problems


(1) Pericardial effusion


Code(s): I31.3 - PERICARDIAL EFFUSION (NONINFLAMMATORY)   





(2) Cardiac tamponade


Code(s): I31.4 - CARDIAC TAMPONADE   





(3) Allergic reaction


Code(s): T78.40XA - ALLERGY, UNSPECIFIED, INITIAL ENCOUNTER   





(4) Tobacco dependence due to cigarettes


Code(s): F17.210 - NICOTINE DEPENDENCE, CIGARETTES, UNCOMPLICATED   





Assessment/Plan





This is a 63 y/o woman with a PMHx of Basal Cell Ca, Tobacco Smoker. Admitted 

to ICU for Pericardial Effusion, Cardiac Tamponade for further evaluation of 

their emergent condition.





Plan:


Admit to ICU


Continue cardiac monitoring


Cardiology following


Thoracic Surgeon following- s/p Percardiocentesis


Monitor pleur-evac


Monitor CBC, BMP


Continue IVF


NPO


Aspiration Precautions


Appreciate ID consult secondary to ?Drug rash reaction


Benadryl prn


Solumederol w/taper


Smoking Cessation discussed


FEN- IVF, Replete lytes prn, NPO (tonight)


DVT ppx- OOB, SCDs





Code Status: Full Code





Dispo: Requires Inpatient Care























Visit type





- Emergency Visit


Emergency Visit: Yes


ED Registration Date: 11/26/19


Care time: The patient presented to the Emergency Department on the above date 

and was hospitalized for further evaluation of their emergent condition.





- New Patient


This patient is new to me today: Yes


Date on this admission: 11/26/19





- Critical Care


Critical Care patient: Yes


Total Critical Care Time (in minutes): 35


Critical Care Statement: The care of this patient involved high complexity 

decision making to prevent further life threatening deterioration of the patient

's condition and/or to evaluate & treat vital organ system(s) failure or risk 

of failure.

## 2019-11-27 LAB
ALBUMIN SERPL-MCNC: 2.6 G/DL (ref 3.4–5)
ALP SERPL-CCNC: 74 U/L (ref 45–117)
ALT SERPL-CCNC: 45 U/L (ref 13–61)
ANION GAP SERPL CALC-SCNC: 6 MMOL/L (ref 8–16)
AST SERPL-CCNC: 15 U/L (ref 15–37)
BASOPHILS # BLD: 0.3 % (ref 0–2)
BILIRUB SERPL-MCNC: 0.4 MG/DL (ref 0.2–1)
BUN SERPL-MCNC: 6 MG/DL (ref 7–18)
CALCIUM SERPL-MCNC: 8.2 MG/DL (ref 8.5–10.1)
CHLORIDE SERPL-SCNC: 108 MMOL/L (ref 98–107)
CO2 SERPL-SCNC: 27 MMOL/L (ref 21–32)
CREAT SERPL-MCNC: 0.4 MG/DL (ref 0.55–1.3)
DEPRECATED RDW RBC AUTO: 13.3 % (ref 11.6–15.6)
EOSINOPHIL # BLD: 0 % (ref 0–4.5)
GLUCOSE SERPL-MCNC: 123 MG/DL (ref 74–106)
HCT VFR BLD CALC: 34.9 % (ref 32.4–45.2)
HGB BLD-MCNC: 11.8 GM/DL (ref 10.7–15.3)
LYMPHOCYTES # BLD: 8.2 % (ref 8–40)
MAGNESIUM SERPL-MCNC: 2.1 MG/DL (ref 1.8–2.4)
MCH RBC QN AUTO: 31.4 PG (ref 25.7–33.7)
MCHC RBC AUTO-ENTMCNC: 33.9 G/DL (ref 32–36)
MCV RBC: 92.5 FL (ref 80–96)
MONOCYTES # BLD AUTO: 7.9 % (ref 3.8–10.2)
NEUTROPHILS # BLD: 83.6 % (ref 42.8–82.8)
PHOSPHATE SERPL-MCNC: 3.7 MG/DL (ref 2.5–4.9)
PLATELET # BLD AUTO: 269 K/MM3 (ref 134–434)
PMV BLD: 8.5 FL (ref 7.5–11.1)
POTASSIUM SERPLBLD-SCNC: 3.7 MMOL/L (ref 3.5–5.1)
PROT SERPL-MCNC: 5 G/DL (ref 6.4–8.2)
RBC # BLD AUTO: 3.77 M/MM3 (ref 3.6–5.2)
SODIUM SERPL-SCNC: 140 MMOL/L (ref 136–145)
WBC # BLD AUTO: 10.9 K/MM3 (ref 4–10)

## 2019-11-27 RX ADMIN — MUPIROCIN SCH APPLIC: 20 OINTMENT TOPICAL at 22:41

## 2019-11-27 RX ADMIN — MUPIROCIN SCH: 20 OINTMENT TOPICAL at 11:47

## 2019-11-27 NOTE — PN
Physical Exam: 


SUBJECTIVE: Patient seen and examined. States her breathing and swelling has 

improved compared to yesterday. Has some soreness at the side of pericardial 

drain insertion. Denies any sick contacts at home.








OBJECTIVE:





 Vital Signs











 Period  Temp  Pulse  Resp  BP Sys/[place]  Pulse Ox


 


 Last 24 Hr  97.4 F-98.5 F    15-23  /64-95  93-96











GENERAL: The patient is awake, alert, and fully oriented, in no acute distress.


HEAD: Normal with no signs of trauma.


EYES: PERRL, EOMI, no scleral icterus 


ENT: Ears normal, nares patent, oropharynx clear without exudates, moist mucous 

membranes.


NECK: Trachea midline, full range of motion, supple. 


LUNGS: Scattered rhonchi, no wheezing, no accessory muscle use  


HEART: distant heart sounds. drain in place. 


ABDOMEN: Soft, nontender, nondistended, normoactive bowel sounds, no guarding


EXTREMITIES: 2+ pulses, warm


NEUROLOGICAL: Cranial nerves II through XII grossly intact. Normal speech, gait 

not observed.


Skin: Diffuse erythematous spotted rash on legs








 Laboratory Results - last 24 hr











  11/26/19 11/26/19 11/26/19





  12:44 12:44 16:40


 


WBC  9.5  


 


RBC  3.99  


 


Hgb  12.4  


 


Hct  36.8  


 


MCV  92.4  


 


MCH  31.1  


 


MCHC  33.7  


 


RDW  13.3  


 


Plt Count  285  


 


MPV  8.4  


 


Absolute Neuts (auto)  6.8  


 


Neutrophils %  71.6  


 


Lymphocytes %  16.6  


 


Monocytes %  9.6  


 


Eosinophils %  1.0  


 


Basophils %  1.2  


 


Nucleated RBC %  0  


 


PT with INR   


 


INR   


 


PTT (Actin FS)   


 


Sodium   133 L 


 


Potassium   3.5 


 


Chloride   100 


 


Carbon Dioxide   26 


 


Anion Gap   7 L 


 


BUN   8.4 


 


Creatinine   0.5 L 


 


Est GFR (CKD-EPI)AfAm   118.54 


 


Est GFR (CKD-EPI)NonAf   102.28 


 


Random Glucose   105 


 


Calcium   8.6 


 


Phosphorus   


 


Magnesium   


 


Total Bilirubin   0.4 


 


AST   26 


 


ALT   54 


 


Alkaline Phosphatase   87 


 


Troponin I   


 


B-Natriuretic Peptide    436.4 H


 


Total Protein   5.7 L 


 


Albumin   3.0 L 


 


Blood Type   


 


Antibody Screen   














  11/26/19 11/26/19 11/26/19





  16:40 16:40 16:40


 


WBC   


 


RBC   


 


Hgb   


 


Hct   


 


MCV   


 


MCH   


 


MCHC   


 


RDW   


 


Plt Count   


 


MPV   


 


Absolute Neuts (auto)   


 


Neutrophils %   


 


Lymphocytes %   


 


Monocytes %   


 


Eosinophils %   


 


Basophils %   


 


Nucleated RBC %   


 


PT with INR  15.90 H  


 


INR  1.34 H  


 


PTT (Actin FS)  27.9  


 


Sodium   


 


Potassium   


 


Chloride   


 


Carbon Dioxide   


 


Anion Gap   


 


BUN   


 


Creatinine   


 


Est GFR (CKD-EPI)AfAm   


 


Est GFR (CKD-EPI)NonAf   


 


Random Glucose   


 


Calcium   


 


Phosphorus   


 


Magnesium   


 


Total Bilirubin   


 


AST   


 


ALT   


 


Alkaline Phosphatase   


 


Troponin I   < 0.02 


 


B-Natriuretic Peptide   


 


Total Protein   


 


Albumin   


 


Blood Type    O POSITIVE


 


Antibody Screen    Negative














  11/27/19 11/27/19 11/27/19





  05:30 05:30 05:30


 


WBC   10.9 H 


 


RBC   3.77 


 


Hgb   11.8 


 


Hct   34.9 


 


MCV   92.5 


 


MCH   31.4 


 


MCHC   33.9 


 


RDW   13.3 


 


Plt Count   269 


 


MPV   8.5 


 


Absolute Neuts (auto)   9.1 H 


 


Neutrophils %   83.6 H 


 


Lymphocytes %   8.2  D 


 


Monocytes %   7.9 


 


Eosinophils %   0.0  D 


 


Basophils %   0.3 


 


Nucleated RBC %   0 


 


PT with INR   


 


INR   


 


PTT (Actin FS)   


 


Sodium    140


 


Potassium    3.7


 


Chloride    108 H


 


Carbon Dioxide    27


 


Anion Gap    6 L


 


BUN    6.0 L


 


Creatinine    0.4 L


 


Est GFR (CKD-EPI)AfAm    126.68


 


Est GFR (CKD-EPI)NonAf    109.30


 


Random Glucose    123 H


 


Calcium    8.2 L


 


Phosphorus    3.7


 


Magnesium    2.1


 


Total Bilirubin    0.4


 


AST    15


 


ALT    45


 


Alkaline Phosphatase    74


 


Troponin I   


 


B-Natriuretic Peptide   


 


Total Protein    5.0 L


 


Albumin    2.6 L


 


Blood Type  O POSITIVE  


 


Antibody Screen   








Active Medications











Generic Name Dose Route Start Last Admin





  Trade Name Freq  PRN Reason Stop Dose Admin


 


Albuterol/Ipratropium  1 amp  11/27/19 11:15  





  Duoneb -  NEB   





  Q6H PRN   





  SHORTNESS OF BREATH   





     





     





     


 


Chlorhexidine Gluconate  1 applic  11/27/19 22:00  





  Hibiclens For Decolonization -  TP   





  [facility]   





     





     





     





     


 


Diphenhydramine HCl  25 mg  11/27/19 08:44  





  Benadryl Injection -  IVPUSH   





  Q6H PRN   





  FOR ITCHING   





     





     





     


 


Heparin Sodium (Porcine)  5,000 unit  11/27/19 14:00  





  Heparin -  SQ   





  [facility]   





     





     





     





     


 


Mupirocin  1 applic  11/27/19 10:00  





  Bactroban Ointment (For Decolonization) -  NS  12/02/19 09:59  





  [facility]   





     





     





     





     











ASSESSMENT/PLAN:


65 y/o/f with PMHx significant for basal cell CA. She presented to the ER with 

complaints of diffuse swelling of her hands, arms, face, and legs associated 

with difficulty breathing and nasal congestion for the past 3 days. CTA Chest 

was performed to r/o PE, and an enlarged heart with significant pericardial 

effusion. Echo was performed which confirmed tamponade, pending 

pericardiocentisis followed by transfer to ICU for monitoring. 





#Neuro


- AAOx3





#Cardio


- CT: Large pericardial effusion, B/L pleural effusions with lower lobe 

atelectasis, moderate COPD, multiple hepatic/renal cysts


- Echo: Large pericardial effusion, (>2cm), diastolic compression  of RV 

collapse due to tamponade, LV size and function normal


- .4


- Trop negative 


- Pericardial drain in place with 42ml of drainage 


- ESR, CRP, FINN pending 





#Pulm


- Not complaining of SOB


- CXR 11/27: development of congestive and infiltrative changes with bilateral 

effusions


- discontinue solumedrol


- Duonebs





#ID


- Follow up cultures, negative so far 


- no need for abx at this time 





#GI


- Regular diet 





 #Renal


- BUN/Cr 11.8/34.9





#Prophylaxis


- SCDs


- Heparin 





#FEN


- Holding fluids due to worsening CXR, pericardial effusion 


- Regular diet 





#Dispo


- Continue ICU monitoring 





Visit type





- Emergency Visit


Emergency Visit: Yes


ED Registration Date: 11/26/19


Care time: The patient presented to the Emergency Department on the above date 

and was hospitalized for further evaluation of their emergent condition.





- New Patient


This patient is new to me today: Yes


Date on this admission: 11/27/19





- Critical Care


Critical Care patient: Yes


Total Critical Care Time (in minutes): 36


Critical Care Statement: The care of this patient involved high complexity 

decision making to prevent further life threatening deterioration of the patient

's condition and/or to evaluate & treat vital organ system(s) failure or risk 

of failure.





ATTENDING PHYSICIAN STATEMENT





I saw and evaluated the patient.


I reviewed the resident's note and discussed the case with the resident.


I agree with the resident's findings and plan as documented.








SUBJECTIVE:








OBJECTIVE:








ASSESSMENT AND PLAN:

## 2019-11-27 NOTE — CON.ID
Consult


Consult Specialty:: infectious disease


Referred by:: dr alfaro


Reason for Consultation:: rash





- History of Present Illness


Chief Complaint: SOB


History of Present Illness: 





63 yo female with two week history of progressive SOB  


thought she was getting a sinus infection went to [facility] and was prescribed 

augmentin which caused swelling of her face and hands and feet


she was given benadryl by EMS with improvement and was given steroids in the ED


no fevrs or chills


she had a chest ct with a large pericardial effusion, echo with tamponade


she is s/p pericardial window 11/26


feels much improved now





no complaints


reports swelling of cheeks and hands are resolved


has a chronic rash on her legs that she has had intermittently since age 24!- 

never biopsied- ?hives per patient





no weight loss


no recent hospital admissions


no history of connective tissue disease


no history of TB














- Past Medical History


...LMP Comment: 8 years ago


...Pregnant: No


Dermatology: Yes: Basal Cell





- Alcohol/Substance Use


Hx Alcohol Use: Yes (Occasional)


History of Substance Use: reports: None





- Smoking History


Smoking history: Unknown if ever smoked


Have you smoked in the past 12 months: Yes


Aproximately how many cigarettes per day: 3





- Social History


Usual Living Arrangement: Alone


ADL: Independent


Occupation: office work [employer]


Place of Birth: [place]


History of Recent Travel: No





Home Medications





- Allergies


Allergies/Adverse Reactions: 


 Allergies











Allergy/AdvReac Type Severity Reaction Status Date / Time


 


amoxicillin Allergy   Verified 11/26/19 19:31














- Home Medications


Home Medications: 


Ambulatory Orders





Bacitracin - [Bacitracin Topical Ointment -] 1 applic TP DAILY #1 tube 11/29/19 











Family Medical History


Family History: Denies





Review of Systems





- Review of Systems


Constitutional: denies: Chills, Diaphoresis, Fever, Lethargy, Loss of Appetite


Eyes: reports: No Symptoms


HENT: reports: No Symptoms.  denies: Difficult Swallowing


Neck: reports: No Symptoms


Cardiovascular: reports: No Symptoms.  denies: Chest Pain


Respiratory: reports: SOB


Gastrointestinal: reports: No Symptoms


Genitourinary: reports: No Symptoms


Breasts: reports: No Symptoms Reported


Musculoskeletal: reports: No Symptoms


Integumentary: reports: Other (per HPI)





Physical Exam


Vital Signs: 


 Vital Signs











Temperature  98.1 F   11/27/19 10:00


 


Pulse Rate  112 H  11/27/19 12:00


 


Respiratory Rate  20   11/27/19 12:00


 


Blood Pressure  112/73   11/27/19 12:00


 


O2 Sat by Pulse Oximetry (%)  95   11/27/19 11:02











Constitutional: Yes: Well Nourished, No Distress, Calm


Eyes: Yes: Conjunctiva Clear


HENT: Yes: Atraumatic, Normocephalic.  No: Pharyngeal Erythema, Thrush


Neck: Yes: Supple, Trachea Midline


Cardiovascular: Yes: Regular Rate and Rhythm, Other (+chest tube)


Respiratory: Yes: Regular, CTA Bilaterally


Gastrointestinal: Yes: Normal Bowel Sounds, Soft


...Rectal Exam: Yes: Deferred


Extremities: Yes: WNL


Edema: No


Integumentary: Yes: Other (papular rash on her legs- she describes as old)


Neurological: Yes: Alert, Oriented


Labs: 


 CBC, BMP





 11/27/19 05:30 





 11/27/19 05:30 











Imaging





- Results


Chest X-ray: Report Reviewed, Image Reviewed


Cat Scan: Report Reviewed





Problem List





- Problems


(1) Cardiac tamponade


Code(s): I31.4 - CARDIAC TAMPONADE   





(2) Pericardial effusion


Code(s): I31.3 - PERICARDIAL EFFUSION (NONINFLAMMATORY)   





Assessment/Plan





pericardial effusion/tamponade- more c/w viral, ?autoimmune, idiopathic most 

likely


nothing to suggest malignancy


check tsh





f/u cultures


f/u cytology and pericardial bioposy





rash on legs is chronic - patient to f/u with dermatologist as outpt

## 2019-11-27 NOTE — PN
Progress Note, Physician


Chief Complaint: 


The patient appears comfortable at the time of exam. She reports no chest pain, 

shortness of breath, palpitation or dizziness. 





Telemetry reviewed, it showed mild sinus tachycardia





History of Present Illness: 


64 year old woman with a PMHx of basal cell cancer, presented to ED 11/26/19 

with sob, facial swelling, generalized itching runny nose, cough, sinus 

congestion for 3 days. 


CTA chest done in ER showed large pericardial effusion and b/l pleural 

effusions.


Echo was done confirming large pericardial effusion with echocardiographic 

signs of tamponade.


She underwent pericardial window in the evening of 11/16/19. Tolerated the 

operation well. 








- Current Medication List


Current Medications: 


Active Medications





Chlorhexidine Gluconate (Hibiclens For Decolonization -)  1 applic TP HS NEELAM


Diphenhydramine HCl (Benadryl Injection -)  25 mg IVPUSH Q6H PRN


   PRN Reason: FOR ITCHING


Methylprednisolone Sodium Succinate (Solu-Medrol -)  40 mg IVPUSH BID NEELAM


   Stop: 11/30/19 09:59


   Last Admin: 11/27/19 10:54 Dose:  40 mg


Mupirocin (Bactroban Ointment (For Decolonization) -)  1 applic NS BID NEELAM


   Stop: 12/02/19 09:59











- Objective


Vital Signs: 


 Vital Signs











Temperature  98.1 F   11/27/19 10:00


 


Pulse Rate  108 H  11/27/19 10:00


 


Respiratory Rate  18   11/27/19 10:00


 


Blood Pressure  110/95   11/27/19 10:00


 


O2 Sat by Pulse Oximetry (%)  95   11/27/19 04:00








General:  Well developed. Well nourished. No acute distress. 


Head: Normocephalic. Atraumatic, 


Eyes: PERRLA, EOMI. Sclerae anicteric. Conjunctivae clear.


Neck: Supple. No JVD. No bruits. 


Heart: Normal S1, S2: Regular rhythm and mild tachycardia. No murmur. No gallop 

or rub. 


Lungs: Symmetrical air entry. Clear to auscultation. No crackles. No wheezing 

or rhonchi.  


Abdomen: Soft. Bowel sound positive. Non tender. No masses. 


Extremities: No edema. No clubbing or cyanosis. PD 2+, equal bilaterally.


Neuro: Intact, no focal findings. AAO X3.


Labs: 


 CBC, BMP





 11/27/19 05:30 





 11/27/19 05:30 





 INR, PTT











INR  1.34  (0.83-1.09)  H  11/26/19  16:40    














Assessment/Plan


64 year old woman with a PMHx of basal cell cancer, presented to ED 11/26/19 

with sob, facial swelling, generalized itching runny nose, cough, sinus 

congestion for 3 days. 


CTA chest done in ER showed large pericardial effusion and b/l pleural 

effusions.


Echo was done confirming large pericardial effusion with echocardiographic 

signs of tamponade.


She underwent pericardial window in the evening of 11/16/19. Tolerated the 

operation well. 





Pericardial effusion-with evidence of tamponade, s/p pericardial window in the 

evening of 11/16/19. 





Fluid chemistry and cytology should be followed for the etiology of pericarditis

, likely viral pericarditis. 


Monitor daily output from pericardial tube.


May use colchicine if pericardial draining persists.

## 2019-11-27 NOTE — PN
Progress Note (short form)





- Note


Progress Note: 





64F denying significant PMH on admission, presenting with tamponade physiology, 

s/p pericardial window under MAC/LA. No new c/o.





 Vital Signs











Temp  98.0 F   11/27/19 04:00


 


Pulse  105 H  11/27/19 08:00


 


Resp  16   11/27/19 08:00


 


BP  102/69   11/27/19 08:00


 


Pulse Ox  95   11/27/19 04:00








 Intake & Output











 11/26/19 11/26/19 11/27/19





 11:59 23:59 11:59


 


Intake Total  1600 883


 


Output Total  20 40


 


Balance  1580 843


 


Weight 160 lb  175 lb 7.807 oz


 


Intake:   


 


  IV  1600 883


 


    NS  1000 883


 


Output:   


 


  Chest Tube Drainage  20 40


 


    anterior chest Anterior   40





    Chest   


 


Other:   


 


  Voiding Method   Bedpan


 


  # Unmeasured Voids   


 


    Void   1


 


  Height 5 ft 6 in  5 ft 6 in


 


  Body Mass Index (BMI) 25.8  28.3


 


  Weight Measurement Method   Built in [facility]


 


  Weight Measurement Method Estimated by Staff  








 CBC, BMP





 11/27/19 05:30 





 11/27/19 05:30 





- no anesthesia complications

## 2019-11-27 NOTE — PN
Teaching Attending Note


Name of Resident: Katy Garcia





ATTENDING PHYSICIAN STATEMENT





I saw and evaluated the patient.


I reviewed the resident's note and discussed the case with the resident.


I agree with the resident's findings and plan as documented.








SUBJECTIVE:





Pt seen and examined in the ICU. Breathing improving. 45mL drainage from chest 

tube. CXR showing congestion.





OBJECTIVE:





 Vital Signs











 Period  Temp  Pulse  Resp  BP Sys/Madison  Pulse Ox


 


 Last 24 Hr  97.4 F-98.5 F    15-23  /64-95  93-96








 Intake & Output











 11/24/19 11/25/19 11/26/19 11/27/19





 23:59 23:59 23:59 23:59


 


Intake Total   1600 883


 


Output Total   20 40


 


Balance   1580 843


 


Weight   72.575 kg 79.6 kg








Gen:  NAD at rest


Heart: RRR


Lung: decreased breath sounds at the bases


Abd: soft, nontender


Ext: no edema





 CBC, BMP





 11/27/19 05:30 





 11/27/19 05:30 





Active Medications





Albuterol/Ipratropium (Duoneb -)  1 amp NEB Q6H PRN


   PRN Reason: SHORTNESS OF BREATH


Chlorhexidine Gluconate (Hibiclens For Decolonization -)  1 applic TP HS NEELAM


Diphenhydramine HCl (Benadryl Injection -)  25 mg IVPUSH Q6H PRN


   PRN Reason: FOR ITCHING


Mupirocin (Bactroban Ointment (For Decolonization) -)  1 applic NS BID NEELAM


   Stop: 12/02/19 09:59








ASSESSMENT AND PLAN:


Pericardial Effusion/Cardiac Tamponade


s/p Pericardial Window


Volume Overload





-  monitor chest tube output


-  lasix today


-  monitor urine output, creatinine


-  f/u pericardial fluid cytology


-  send FINN, ESR, CRP


-  O2 to keep SpO2 >90%


-  DVT prophylaxis


-  continue ICU monitoring

## 2019-11-27 NOTE — OP
DATE OF OPERATION:  11/26/2019

 

PROCEDURE PERFORMED:  Subxiphoid pericardiotomy for drainage of pericardial fluid,

drainage of pericardial effusion.

 

SURGEON:  France Sanders MD

 

ASSISTANT:  Hossein Goodrich MD

 

PREOPERATIVE DIAGNOSIS:  Pericardial tamponade.

 

POSTOPERATIVE DIAGNOSIS:  Pericardial tamponade.

 

INDICATION:  A 63-year-old female with a 7- to 10-day history of shortness of breath.

 Visited ED.  Was found to have a large pericardial effusion.  JENNIFER confirmed

tamponade with collapse of RV.  Patient was informed about the risks and benefits of

surgery and alternative treatment and consented for surgery.

 

PROCEDURE IN DETAIL:  Patient was brought into OR, was placed in a supine position,

was all the time hemodynamically stable.  Prior to surgery was loaded with 2 L of

saline.  Using Marcaine 0.25% and lidocaine incision was made in the upper abdomen. 

The xiphoid was dissected and resected.  The pericardium was visualized and opened

with Bovie.  One liter of serosanguineous fluid was suctioned from pericardium. 

Hereafter the pericardiotomy extended and a 20-[place] chest tube was tunneled under

the skin and 20-[place] chest tube was placed posterior in the pericardium.  The

incision was closed after assuring that hemostasis was secure.  The incisions were

closed using 0 Vicryl at the level of fascia, 2-0 Vicryl at the level of subcutaneous

tissue and staples at the level of skin.  I performed the procedure as dictated

above.  I was in the OR during the whole procedure and remained available thereafter.

 

 

SRAVAN ROSEN/[id number]

DD: 11/27/2019 12:03

DT: 11/27/2019 12:31

Job #:  [id number]

## 2019-11-27 NOTE — PN
Progress Note, Physician





- Current Medication List


Current Medications: 


Active Medications





Chlorhexidine Gluconate (Hibiclens For Decolonization -)  1 applic TP HS NEELAM


Diphenhydramine HCl (Benadryl Injection -)  25 mg IVPUSH Q6H PRN


   PRN Reason: FOR ITCHING


Methylprednisolone Sodium Succinate (Solu-Medrol -)  40 mg IVPUSH BID NEELAM


   Stop: 11/30/19 09:59


Mupirocin (Bactroban Ointment (For Decolonization) -)  1 applic NS BID [facility]


   Stop: 12/02/19 09:59











- Objective


Vital Signs: 


 Vital Signs











Temperature  98.1 F   11/27/19 10:00


 


Pulse Rate  108 H  11/27/19 10:00


 


Respiratory Rate  18   11/27/19 10:00


 


Blood Pressure  110/95   11/27/19 10:00


 


O2 Sat by Pulse Oximetry (%)  95   11/27/19 04:00











Cardiovascular: Yes: Murmur, S1, S2


Respiratory: Yes: Diminished, On Nasal O2, Rhonchi


Gastrointestinal: Yes: Normal Bowel Sounds, Soft


Edema: No


Neurological: Yes: Alert, Oriented


Labs: 


 CBC, BMP





 11/27/19 05:30 





 11/27/19 05:30 





 INR, PTT











INR  1.34  (0.83-1.09)  H  11/26/19  16:40    














Problem List





- Problems


(1) Cardiac tamponade


Assessment/Plan: 


ICU


Continue cardiac monitoring


Cardiology following


Thoracic Surgeon following- s/p Percardiocentesis


Monitor pleur-evac


Appreciate ID consult 





Code(s): I31.4 - CARDIAC TAMPONADE   





(2) Pericardial effusion


Assessment/Plan: 


as above


esr/crp/andrez


id consult


Code(s): I31.3 - PERICARDIAL EFFUSION (NONINFLAMMATORY)   





(3) COPD (chronic obstructive pulmonary disease)


Assessment/Plan: 


Solumederol w/taper


Smoking Cessation discussed





Code(s): J44.9 - CHRONIC OBSTRUCTIVE PULMONARY DISEASE, UNSPECIFIED   





(4) Edema


Assessment/Plan: 


resolved


Code(s): R60.9 - EDEMA, UNSPECIFIED

## 2019-11-27 NOTE — EKG
Test Reason : 

Blood Pressure : ***/*** mmHG

Vent. Rate : 113 BPM     Atrial Rate : 113 BPM

   P-R Int : 168 ms          QRS Dur : 078 ms

    QT Int : 292 ms       P-R-T Axes : 052 034 100 degrees

   QTc Int : 400 ms

 

SINUS TACHYCARDIA

LOW VOLTAGE QRS

CANNOT RULE OUT ANTERIOR INFARCT (CITED ON OR BEFORE 26-NOV-2019)

ABNORMAL ECG

WHEN COMPARED WITH ECG OF 26-NOV-2019 16:59,

NO SIGNIFICANT CHANGE WAS FOUND

Confirmed by JOHNY SANTANA, TYRA ([id number]) on 11/27/2019 10:17:53 AM

 

Referred By:             Confirmed By:TYRA MCCLAIN MD

## 2019-11-28 LAB
ALBUMIN SERPL-MCNC: 2.6 G/DL (ref 3.4–5)
ALP SERPL-CCNC: 76 U/L (ref 45–117)
ALT SERPL-CCNC: 42 U/L (ref 13–61)
ANION GAP SERPL CALC-SCNC: 7 MMOL/L (ref 8–16)
AST SERPL-CCNC: 20 U/L (ref 15–37)
BILIRUB SERPL-MCNC: 0.2 MG/DL (ref 0.2–1)
BUN SERPL-MCNC: 9.7 MG/DL (ref 7–18)
CALCIUM SERPL-MCNC: 8.2 MG/DL (ref 8.5–10.1)
CHLORIDE SERPL-SCNC: 107 MMOL/L (ref 98–107)
CO2 SERPL-SCNC: 28 MMOL/L (ref 21–32)
CREAT SERPL-MCNC: 0.5 MG/DL (ref 0.55–1.3)
DEPRECATED RDW RBC AUTO: 13.7 % (ref 11.6–15.6)
GLUCOSE SERPL-MCNC: 83 MG/DL (ref 74–106)
HCT VFR BLD CALC: 35.8 % (ref 32.4–45.2)
HGB BLD-MCNC: 11.8 GM/DL (ref 10.7–15.3)
MAGNESIUM SERPL-MCNC: 2 MG/DL (ref 1.8–2.4)
MCH RBC QN AUTO: 31.1 PG (ref 25.7–33.7)
MCHC RBC AUTO-ENTMCNC: 33 G/DL (ref 32–36)
MCV RBC: 94.1 FL (ref 80–96)
PHOSPHATE SERPL-MCNC: 3.4 MG/DL (ref 2.5–4.9)
PLATELET # BLD AUTO: 260 K/MM3 (ref 134–434)
PMV BLD: 8.2 FL (ref 7.5–11.1)
POTASSIUM SERPLBLD-SCNC: 3.5 MMOL/L (ref 3.5–5.1)
PROT SERPL-MCNC: 5 G/DL (ref 6.4–8.2)
RBC # BLD AUTO: 3.81 M/MM3 (ref 3.6–5.2)
SODIUM SERPL-SCNC: 142 MMOL/L (ref 136–145)
WBC # BLD AUTO: 11.6 K/MM3 (ref 4–10)

## 2019-11-28 RX ADMIN — IBUPROFEN PRN MG: 600 TABLET, FILM COATED ORAL at 20:40

## 2019-11-28 RX ADMIN — MUPIROCIN SCH APPLIC: 20 OINTMENT TOPICAL at 10:07

## 2019-11-28 RX ADMIN — HEPARIN SODIUM SCH UNIT: 5000 INJECTION, SOLUTION INTRAVENOUS; SUBCUTANEOUS at 21:22

## 2019-11-28 NOTE — PN
Teaching Attending Note


Name of Resident: Katy Garcia





ATTENDING PHYSICIAN STATEMENT





I saw and evaluated the patient.


I reviewed the resident's note and discussed the case with the resident.


I agree with the resident's findings and plan as documented.








SUBJECTIVE:





Pt seen and examined in the ICU. Still some residual chest tightness. States 

breathing has improved. Chest tube with minimal drainage.





OBJECTIVE:





 Vital Signs











 Period  Temp  Pulse  Resp  BP Sys/Madison  Pulse Ox


 


 Last 24 Hr  97.8 F-99.2 F    15-24  108-130/66-95  95-97








 Intake & Output











 11/25/19 11/26/19 11/27/19 11/28/19





 23:59 23:59 23:59 23:59


 


Intake Total  1600 1933 250


 


Output Total  20 1680 10


 


Balance  1580 253 240


 


Weight  72.575 kg 79.6 kg 95.368 kg








Gen:  NAD at rest


Heart: RRR


Lung: decreased breath sounds at the bases


Abd: soft, nontender


Ext: no edema


Chest tube minimal drainage





 CBC, BMP





 11/28/19 06:15 





 11/28/19 06:15 





Active Medications





Albuterol/Ipratropium (Duoneb -)  1 amp NEB Q6H PRN


   PRN Reason: SHORTNESS OF BREATH


Bacitracin (Bacitracin -)  1 applic TP DAILY NEELAM


Chlorhexidine Gluconate (Hibiclens For Decolonization -)  1 applic TP HS NEELAM


   Last Admin: 11/27/19 22:42 Dose:  1 applic


Diphenhydramine HCl (Benadryl Injection -)  25 mg IVPUSH Q6H PRN


   PRN Reason: FOR ITCHING


Heparin Sodium (Porcine) (Heparin -)  5,000 unit SQ BID NEELAM


Mupirocin (Bactroban Ointment (For Decolonization) -)  1 applic NS BID NEELAM


   Stop: 12/02/19 09:59


   Last Admin: 11/27/19 22:41 Dose:  1 applic








ASSESSMENT AND PLAN:


Pericardial Effusion/Cardiac Tamponade


s/p Pericardial Window


Volume Overload


COPD





-  monitor chest tube output


-  lasix as needed


-  monitor urine output, creatinine


-  f/u pericardial fluid cytology


-  f/u FINN


-  O2 to keep SpO2 >90%


-  DVT prophylaxis

## 2019-11-28 NOTE — PN
Progress Note, Physician


Chief Complaint: 


The patient appears comfortable at the time of exam. She complains of fatigue. 

But she reports no chest pain, shortness of breath, palpitation or dizziness. 





Telemetry reviewed, it showed sinus rhythm in high 90s. 





History of Present Illness: 


64 year old woman with a PMHx of basal cell cancer, presented to ED 11/26/19 

with sob, facial swelling, generalized itching runny nose, cough, sinus 

congestion for 3 days. 


CTA chest done in ER showed large pericardial effusion and b/l pleural 

effusions.


Echo was done confirming large pericardial effusion with echocardiographic 

signs of tamponade.


She underwent pericardial window in the evening of 11/16/19. Tolerated the 

operation well. 








- Current Medication List


Current Medications: 


Active Medications





Albuterol/Ipratropium (Duoneb -)  1 amp NEB Q6H PRN


   PRN Reason: SHORTNESS OF BREATH


Bacitracin (Bacitracin -)  1 applic TP DAILY [facility]


   Last Admin: 11/28/19 10:07 Dose:  1 applic


Chlorhexidine Gluconate (Hibiclens For Decolonization -)  1 applic TP HS [facility]


   Last Admin: 11/27/19 22:42 Dose:  1 applic


Diphenhydramine HCl (Benadryl Injection -)  25 mg IVPUSH Q6H PRN


   PRN Reason: FOR ITCHING


Heparin Sodium (Porcine) (Heparin -)  5,000 unit SQ BID [facility]


   Last Admin: 11/28/19 10:07 Dose:  5,000 unit


Mupirocin (Bactroban Ointment (For Decolonization) -)  1 applic NS BID [facility]


   Stop: 12/02/19 09:59


   Last Admin: 11/28/19 10:07 Dose:  1 applic











- Objective


Vital Signs: 


 Vital Signs











Temperature  98.1 F   11/28/19 12:00


 


Pulse Rate  89   11/28/19 12:00


 


Respiratory Rate  18   11/28/19 12:00


 


Blood Pressure  126/76   11/28/19 12:00


 


O2 Sat by Pulse Oximetry (%)  97   11/28/19 08:00








General:  Well developed. Well nourished. No acute distress. 


Head: Normocephalic. Atraumatic, 


Eyes: PERRLA, EOMI. Sclerae anicteric. Conjunctivae clear.


Neck: Supple. No JVD. No bruits. 


Heart: Normal S1, S2: Regular rhythm and rate. No murmur. No gallop or rub. 


Lungs: Symmetrical air entry. Clear to auscultation. No crackles. No wheezing 

or rhonchi.  


Abdomen: Soft. Bowel sound positive. Non tender. No masses. 


Extremities: No edema. No clubbing or cyanosis. PD 2+, equal bilaterally.


Neuro: Intact, no focal findings. AAO X3.


Labs: 


 CBC, BMP





 11/28/19 06:15 





 11/28/19 06:15 





 INR, PTT











INR  1.34  (0.83-1.09)  H  11/26/19  16:40    














Assessment/Plan


64 year old woman with a PMHx of basal cell cancer, presented to ED 11/26/19 

with sob, facial swelling, generalized itching runny nose, cough, sinus 

congestion for 3 days. 


CTA chest done in ER showed large pericardial effusion and b/l pleural 

effusions.


Echo was done confirming large pericardial effusion with echocardiographic 

signs of tamponade.


She underwent pericardial window in the evening of 11/16/19. Tolerated the 

operation well. 





Pericardial effusion-with evidence of tamponade, s/p pericardial window in the 

evening of 11/16/19. 





Fluid chemistry and cytology should be followed for the etiology of pericarditis

, likely viral pericarditis. 


Monitor daily output from pericardial tube. May remove pericardial tube when 

draining less than 100 ml/day as per surgery.


May use colchicine if pericardial draining persists.

## 2019-11-28 NOTE — PN
Physical Exam: 


SUBJECTIVE: Patient seen and examined. Breathing continued to improve. Still 

has some soreness at site of drain placement. Complains of pain when coughing 

but otherwise no complaints. No events overnight. 








OBJECTIVE:





 Vital Signs











 Period  Temp  Pulse  Resp  BP Sys/[place]  Pulse Ox


 


 Last 24 Hr  97.8 F-99.2 F    15-24  108-130/66-76  95-97











GENERAL: The patient is awake, alert, and fully oriented, in no acute distress.


HEAD: Normal with no signs of trauma.


EYES: PERRL, EOMI, no scleral icterus, no ptosis 


ENT: moist mucous membranes.


NECK: Trachea midline, full range of motion, supple. 


LUNGS: Breath sounds equal, clear to auscultation bilaterally, no wheezes, no 

crackles, no accessory muscle use. 


HEART: RRR, S1, S2 normal 


CHEST: drain in place without signs of erythema or drainage at insertion site 


ABDOMEN: Soft, nontender, nondistended, normoactive bowel sounds, no guarding


EXTREMITIES: 2+ pulses, warm, well-perfused, no edema


NEUROLOGICAL: Normal speech, gait not observed.


PSYCH: Normal mood, normal affect.








 Laboratory Results - last 24 hr











  11/27/19 11/27/19 11/28/19





  12:30 12:30 06:15


 


WBC    11.6 H


 


RBC    3.81


 


Hgb    11.8


 


Hct    35.8


 


MCV    94.1


 


MCH    31.1


 


MCHC    33.0


 


RDW    13.7


 


Plt Count    260


 


MPV    8.2


 


ESR   6 


 


Sodium   


 


Potassium   


 


Chloride   


 


Carbon Dioxide   


 


Anion Gap   


 


BUN   


 


Creatinine   


 


Est GFR (CKD-EPI)AfAm   


 


Est GFR (CKD-EPI)NonAf   


 


Random Glucose   


 


Calcium   


 


Phosphorus   


 


Magnesium   


 


Total Bilirubin   


 


AST   


 


ALT   


 


Alkaline Phosphatase   


 


C-Reactive Protein  2.6 H  


 


Total Protein   


 


Albumin   


 


TSH   














  11/28/19





  06:15


 


WBC 


 


RBC 


 


Hgb 


 


Hct 


 


MCV 


 


MCH 


 


MCHC 


 


RDW 


 


Plt Count 


 


MPV 


 


ESR 


 


Sodium  142


 


Potassium  3.5


 


Chloride  107


 


Carbon Dioxide  28


 


Anion Gap  7 L


 


BUN  9.7


 


Creatinine  0.5 L


 


Est GFR (CKD-EPI)AfAm  117.71


 


Est GFR (CKD-EPI)NonAf  101.56


 


Random Glucose  83


 


Calcium  8.2 L


 


Phosphorus  3.4


 


Magnesium  2.0


 


Total Bilirubin  0.2


 


AST  20


 


ALT  42


 


Alkaline Phosphatase  76


 


C-Reactive Protein 


 


Total Protein  5.0 L


 


Albumin  2.6 L


 


TSH  1.09








Active Medications











Generic Name Dose Route Start Last Admin





  Trade Name Freq  PRN Reason Stop Dose Admin


 


Albuterol/Ipratropium  1 amp  11/28/19 12:42  





  Duoneb -  NEB   





  Q6H PRN   





  SHORTNESS OF BREATH   





     





     





     


 


Bacitracin  1 applic  11/29/19 10:00  





  Bacitracin -  TP   





  DAILY [facility]   





     





     





     





     


 


Chlorhexidine Gluconate  1 applic  11/28/19 22:00  





  Hibiclens For Decolonization -  TP   





  HS NEELAM   





     





     





     





     


 


Diphenhydramine HCl  25 mg  11/28/19 12:42  





  Benadryl Injection -  IVPUSH   





  Q6H PRN   





  FOR ITCHING   





     





     





     


 


Heparin Sodium (Porcine)  5,000 unit  11/28/19 22:00  





  Heparin -  SQ   





  BID NEELAM   





     





     





     





     


 


Mupirocin  1 applic  11/28/19 22:00  





  Bactroban Ointment (For Decolonization) -  NS  12/02/19 09:59  





  BID [facility]   





     





     





     





     











ASSESSMENT/PLAN:


63 y/o/f with PMHx significant for basal cell CA. She presented to the ER with 

complaints of diffuse swelling of her hands, arms, face, and legs associated 

with difficulty breathing and nasal congestion for the past 3 days. CTA Chest 

was performed to r/o PE, and an enlarged heart with significant pericardial 

effusion. Echo was performed which confirmed tamponade, pending 

pericardiocentisis followed by transfer to ICU for monitoring. 





#Neuro


- AAOx3





#Cardio


- CT: Large pericardial effusion, B/L pleural effusions with lower lobe 

atelectasis, moderate COPD, multiple hepatic/renal cysts


- Echo: Large pericardial effusion, (>2cm), diastolic compression  of RV 

collapse due to tamponade, LV size and function normal


- .4


- Trop negative 


- Pericardial drain in place with total of 102ml of drainage since insertion 


- Fluid chemistry and cytology should be followed for the etiology of 

pericarditis, likely viral pericarditis. 


- Monitor daily output from pericardial tube. May remove pericardial tube when 

draining less than 100 ml/day as per surgery.


- May use colchicine if pericardial draining persists.





#Pulm


- Not complaining of SOB


- CXR 11/28: Decreasing congestive changes with subtle bibasilar fluid and 

atelectasis 


- Duonebs





#ID


- Follow up cultures, negative so far 


- no need for abx at this time 





#GI


- Regular diet 





 #Renal


- BUN/Cr 9.7/0.5





#Prophylaxis


- SCDs


- Heparin 





#FEN


- Regular diet  





#Dispo


- Stable for transfer to tele. PCP aware.  





Visit type





- Emergency Visit


Emergency Visit: Yes


ED Registration Date: 11/26/19


Care time: The patient presented to the Emergency Department on the above date 

and was hospitalized for further evaluation of their emergent condition.





- New Patient


This patient is new to me today: No





- Critical Care


Critical Care patient: Yes


Total Critical Care Time (in minutes): 36


Critical Care Statement: The care of this patient involved high complexity 

decision making to prevent further life threatening deterioration of the patient

's condition and/or to evaluate & treat vital organ system(s) failure or risk 

of failure.





ATTENDING PHYSICIAN STATEMENT





I saw and evaluated the patient.


I reviewed the resident's note and discussed the case with the resident.


I agree with the resident's findings and plan as documented.








SUBJECTIVE:








OBJECTIVE:








ASSESSMENT AND PLAN:

## 2019-11-28 NOTE — PN
Progress Note, Physician


Chief Complaint: 





AWAKE ALERT EVENTS AND NOTES REVIEWED


EATING BREAKFAST


NAD


DENIES FEVER OR CHILLS








- Current Medication List


Current Medications: 


Active Medications





Albuterol/Ipratropium (Duoneb -)  1 amp NEB Q6H PRN


   PRN Reason: SHORTNESS OF BREATH


Chlorhexidine Gluconate (Hibiclens For Decolonization -)  1 applic TP HS NEELAM


   Last Admin: 11/27/19 22:42 Dose:  1 applic


Diphenhydramine HCl (Benadryl Injection -)  25 mg IVPUSH Q6H PRN


   PRN Reason: FOR ITCHING


Mupirocin (Bactroban Ointment (For Decolonization) -)  1 applic NS BID NEELAM


   Stop: 12/02/19 09:59


   Last Admin: 11/27/19 22:41 Dose:  1 applic











- Objective


Vital Signs: 


 Vital Signs











Temperature  97.8 F   11/28/19 08:00


 


Pulse Rate  94 H  11/28/19 08:00


 


Respiratory Rate  20   11/28/19 08:00


 


Blood Pressure  130/76   11/28/19 08:00


 


O2 Sat by Pulse Oximetry (%)  95   11/27/19 20:37











Constitutional: Yes: No Distress


Cardiovascular: Yes: Regular Rate and Rhythm


Respiratory: Yes: Diminished, Other (CHEST TUBE TO GRAVITY 100CC)


Gastrointestinal: Yes: Soft


Genitourinary: Yes: WNL


Musculoskeletal: Yes: WNL


Extremities: Yes: WNL


Edema: No


Peripheral Pulses WNL: Yes


Integumentary: Yes: WNL, Other (RIGHT ARM IV SITE ERYTHEMA)


Wound/Incision: Yes: Clean/Dry


Neurological: Yes: WNL


...Motor Strength: WNL


Psychiatric: Yes: WNL


Labs: 


 CBC, BMP





 11/28/19 06:15 





 11/28/19 06:15 





 INR, PTT











INR  1.34  (0.83-1.09)  H  11/26/19  16:40    














Problem List





- Problems


(1) Allergic reaction


Code(s): T78.40XA - ALLERGY, UNSPECIFIED, INITIAL ENCOUNTER   





(2) COPD (chronic obstructive pulmonary disease)


Code(s): J44.9 - CHRONIC OBSTRUCTIVE PULMONARY DISEASE, UNSPECIFIED   





(3) Cardiac tamponade


Code(s): I31.4 - CARDIAC TAMPONADE   





(4) Edema


Code(s): R60.9 - EDEMA, UNSPECIFIED   





(5) Pericardial effusion


Code(s): I31.3 - PERICARDIAL EFFUSION (NONINFLAMMATORY)   





(6) Tobacco dependence due to cigarettes


Code(s): F17.210 - NICOTINE DEPENDENCE, CIGARETTES, UNCOMPLICATED   





Assessment/Plan





CHEST  PERICARDIAL TUBE TO GRAVITY


100C DRAINAGE , CYTOLOGY AND LABS PENDING.


TRANSFER TO TELEMETRY


OOB TO CHAIR


GI PROPHYLAXIS


ID/PULM F/U APPRECIATED


BACITRACIN TO RIGHT FOREARM

## 2019-11-29 VITALS — SYSTOLIC BLOOD PRESSURE: 124 MMHG | DIASTOLIC BLOOD PRESSURE: 70 MMHG | HEART RATE: 92 BPM | TEMPERATURE: 98.2 F

## 2019-11-29 RX ADMIN — HEPARIN SODIUM SCH UNIT: 5000 INJECTION, SOLUTION INTRAVENOUS; SUBCUTANEOUS at 10:35

## 2019-11-29 RX ADMIN — IBUPROFEN PRN MG: 600 TABLET, FILM COATED ORAL at 05:47

## 2019-11-29 NOTE — PN
Progress Note, Physician


History of Present Illness: 


seen and examined today in [facility]. no overnight events. no new complaints. states 

she is feeling better. 








- Current Medication List


Current Medications: 


Active Medications





Albuterol/Ipratropium (Duoneb -)  1 amp NEB Q6H PRN


   PRN Reason: SHORTNESS OF BREATH


Bacitracin (Bacitracin -)  1 applic TP DAILY [facility]


Diphenhydramine HCl (Benadryl Injection -)  25 mg IVPUSH Q6H PRN


   PRN Reason: FOR ITCHING


Heparin Sodium (Porcine) (Heparin -)  5,000 unit SQ BID NEELAM


   Last Admin: 11/28/19 21:22 Dose:  5,000 unit


Ibuprofen (Motrin -)  600 mg PO Q4H PRN


   PRN Reason: PAIN LEVEL 6-10


   Last Admin: 11/29/19 05:47 Dose:  600 mg











- Objective


Vital Signs: 


 Vital Signs











Temperature  98.6 F   11/29/19 06:00


 


Pulse Rate  96 H  11/29/19 06:00


 


Respiratory Rate  20   11/29/19 06:00


 


Blood Pressure  133/64   11/29/19 06:00


 


O2 Sat by Pulse Oximetry (%)  95   11/28/19 20:49











Constitutional: Yes: No Distress, Calm


Eyes: Yes: Conjunctiva Clear, EOM Intact


HENT: Yes: Atraumatic, Normocephalic


Neck: Yes: Supple, Trachea Midline


Cardiovascular: Yes: Regular Rate and Rhythm, S1, S2.  No: Bradycardia, 

Tachycardia, Pulse Irregular, Bruit, JVD, Gallop, Murmur, Rub, S3, S4, 

Varicosities


Respiratory: Yes: Regular, CTA Bilaterally.  No: Rales, Rhonchi, Wheezes


Gastrointestinal: Yes: Normal Bowel Sounds, Soft.  No: Distention, Tenderness


Extremities: Yes: WNL


Edema: No


Peripheral Pulses WNL: Yes


Peripheral Pulses: Left Doralis Pedis: 2+, Right Dorsalis Pedis: 2+


Neurological: Yes: Alert, Oriented


Psychiatric: Yes: Alert, Oriented


Labs: 


 CBC, BMP





 11/28/19 06:15 





 11/28/19 06:15 





 INR, PTT











INR  1.34  (0.83-1.09)  H  11/26/19  16:40    














- ....Imaging


Chest X-ray: Report Reviewed, Image Reviewed


EKG: Report Reviewed, Image Reviewed


Other: Report Reviewed, Image Reviewed (tele-no sig arrhythmias recorded)





Assessment/Plan


64 year old woman with a PMHx of basal cell cancer, presented to ED 11/26/19 

with sob, facial swelling, generalized itching runny nose, cough, sinus 

congestion for 3 days. 


CTA chest done in ER showed large pericardial effusion and b/l pleural 

effusions.


Echo was done confirming large pericardial effusion with echocardiographic 

signs of tamponade.


She underwent pericardial window in the evening of 11/16/19. Tolerated the 

operation well. 





Pericardial effusion-with evidence of tamponade, s/p pericardial window in the 

evening of 11/16/19. 





Fluid chemistry and cytology should be followed for the etiology of pericarditis

, likely viral pericarditis. 


Monitor daily output from pericardial tube. May remove pericardial tube when 

draining less than 100 ml/day as per surgery.


May use colchicine if pericardial draining persists.


check limited echo today to re-evaluate effusion.


Thoracic surgery fup for pericardial drain management.

## 2019-11-29 NOTE — PN
Progress Note (short form)





- Note


Progress Note: 





THORACIC SURGERY





POD #3 s/p subxiphoid pericardiotomy (pericardial tamponade)





No actue events over past 24 hrs per RN notes.


Patient alert. Sitting in chair at bedside. States she feels much better 

compared to when she was admitted to hospital. 


SPO2 96% on RA. Denies n/v/f/c, CP, palpitations, SOB or CORRALES. Denies hemoptysis.





 Last Vital Signs











Temp Pulse Resp BP Pulse Ox


 


 98.6 F   96 H  20   133/64   95 


 


 11/29/19 06:00  11/29/19 06:00  11/29/19 06:00  11/29/19 06:00  11/28/19 20:49








 24 hr Drain Output











  11/28/19 11/28/19 11/29/19





  06:00 12:06 06:07


 


Pericardial 10 12 20








Gen: nad


Cx: subxiphoid incision with staples intact. Pericardial drain w/ 42 mL (serous

) / 24 hours.








Problem List





- Problems


(1) Cardiac tamponade


Assessment/Plan: 


POD #3 s/p subxiphoid pericardiotomy (pericardial tamponade)





pericardial drain dc'd while on rounds.


   - wound edges cleansed, steri strips applied to close ostium. Clean dry 

occlusive dressing applied.


Post-pull tube CXR ordered


Cont medical management


Patient to f/u w/ Dr. Sanders in 7-10 days s/p discharge from hospital to have 

her staple removed.


Above plan discussed with Dr. Sanders and agrees.





Code(s): I31.4 - CARDIAC TAMPONADE   





(2) Pericardial effusion


Code(s): I31.3 - PERICARDIAL EFFUSION (NONINFLAMMATORY)   





(3) COPD (chronic obstructive pulmonary disease)


Code(s): J44.9 - CHRONIC OBSTRUCTIVE PULMONARY DISEASE, UNSPECIFIED   





(4) Tobacco dependence due to cigarettes


Code(s): F17.210 - NICOTINE DEPENDENCE, CIGARETTES, UNCOMPLICATED

## 2019-11-29 NOTE — DS
Physical Examination


Vital Signs: 


 Vital Signs











Temperature  98.6 F   11/29/19 06:00


 


Pulse Rate  96 H  11/29/19 06:00


 


Respiratory Rate  20   11/29/19 06:00


 


Blood Pressure  133/64   11/29/19 06:00


 


O2 Sat by Pulse Oximetry (%)  95   11/28/19 20:49











Constitutional: Yes: Calm


Cardiovascular: Yes: Regular Rate and Rhythm, S1, S2, Other (dressing intact)


Respiratory: Yes: CTA Bilaterally


Gastrointestinal: Yes: Normal Bowel Sounds, Soft


Edema: No


Neurological: Yes: Alert, Oriented


Labs: 


 CBC, BMP





 11/28/19 06:15 





 11/28/19 06:15 











Discharge Summary


Problems reviewed: Yes


Reason For Visit: CARDIAC TAMPONADE


Current Active Problems





Allergic reaction (Acute)


COPD (chronic obstructive pulmonary disease) (Acute)


Cardiac tamponade (Acute)


Edema (Acute)


Pericardial effusion (Acute)


Tobacco dependence due to cigarettes (Acute)








Other Procedures: s/p pericardial drain for pericardial tamponade


Hospital Course: 


This is a 63 year old woman with a PMHx of Basal Cell Ca, Tobacco Smoker. Who 

presents to the ED with SOB x 4 days, facial swelling, generalized itching, 

runny nose, cough, sinus congestion for 3 days. She has been on Amoxicillin at 

home (from visit to Urgent Care last Sunday), when she developed pruritus, 

erythematous macular, papular rash and was SOB. She became concerned for an 

Allergic Reaction to Amoxicillin prompting her to seek evaluation in the ED. 

Patient was taken to the OR with Dr. Sanders- Pericardiocentesis





ED course was noted for:





(1) CTA chest done in ED- showed large Pericardial Effusion and b/l Pleural 

Effusions.


(2) Echo was done confirming large pericardial effusion with echocardiographic 

signs of Tamponade.





s/p pericardial drain and now was removed in AM


fu with PMD AND SURGERY IN ONNE WEEK


Condition: Stable





- Instructions


Diet, Activity, Other Instructions: 


Post Operative Instructions





Physical activity


Resume your normal everyday activity as tolerated no heavy lifting or exercise 

until seen by your surgeon. 


You may walk unlimited amounts of and climb stairs. 


You may resume driving the car when you feel safe and comfortable behind the 

wheel.





Wound care


If you have a bandage, leave it on, and keep dry for 48 - 72 hours. After that 

time discard the outer bandage.


Patient to follow-up with Dr. Sanders in two weeks. Her staples will be removed 

during that office visit.





Diet


There are no dietary restrictions. Eat healthy, high-fiber foods. Drink 6 to 8 

glasses of liquid each day. This will assist in keeping your bowels are regular.





Pain management


You may take Tylenol or acetaminophen or Ibuprofen (for example, Motrin, Advil 

etc.) Any pain prescription medication ordered should be taken as prescribed 

for moderate to severe pain.





Call Dr. Sanders for any of the following:





Severe pain not relieved by medication


Fever of 101 or higher


Excessive bleeding or drainage on dressing





If you develop shortness of breath and or difficulty breathing call 911 and go 

to nearest ED for evaluation


Referrals: 


Janay Shaffer MD [Primary Care Provider] - 


France Sanders MD [Staff Physician] - 1 Week (for staple removal)


Disposition: HOME





- Home Medications


Comprehensive Discharge Medication List: 


Ambulatory Orders





Amoxicillin/Potassium Clav [Amox-Clav 875-125 mg Tablet] 1 mg PO BID 11/26/19

## 2019-11-29 NOTE — ECHO
______________________________________________________________________________



Name: SANDER RAHMAN                                   Exam:Adult Echocardiogram

MRN: [id number]         Study Date: 2019 09:41 AM

Age: 64 yrs

______________________________________________________________________________



Height: 66 in        Weight: 173 lb        BSA: 1.9 m2



______________________________________________________________________________



MMode/2D Measurements & Calculations

IVSd: 0.91 cm                                          Ao root diam: 2.6 cm

LVIDd: 3.5 cm                                          LA dimension: 3.4 cm

LVIDs: 2.4 cm

LVPWd: 0.96 cm



________________________________________________________

LVPWs: 0.83 cm                                         EDV(Teich): 51.7 ml

                                                       ESV(Teich): 20.8 ml



________________________________________________________

RV S Heidy: 6.7 cm/sec



Doppler Measurements & Calculations

MV E max heidy: 97.0 cm/sec                            Ao V2 max: 106.1 cm/sec

MV A max heidy: 48.5 cm/sec                            Ao max P.7 mmHg

MV E/A: 2.0

MV dec time: 0.13 sec



______________________________________________________

LV V1 max P.6 mmHg                               TR max heidy: 204.0 cm/sec

LV V1 max: 106.7 cm/sec                              TR max P.7 mmHg



______________________________________________________

PA V2 max: 85.2 cm/sec                               Med Peak E' Heidy: 5.0 cm/sec

PA max P.9 mmHg                                  Med E/e': 19.4

                                                     Lat Peak E' Heidy: 6.5 cm/sec

                                                     Lat E/e': 14.9





______________________________________________________________________________

Procedure

The study was technically difficult with many images being suboptimal in quality.

Left Ventricle

Left ventricular systolic function is grossly normal. Ejection Fraction = 50%. Septal motion is consi
stent

with conduction abnormality.

Right Ventricle

The right ventricle is grossly normal size. The right ventricular systolic function is grossly normal
.

Atria

Normal left and right atrial size and function.

Mitral Valve

There is mild mitral valve thickening. There is no mitral valve stenosis. There is no mitral regurgit
ation

noted.

Tricuspid Valve

The tricuspid valve is normal in structure and function. There is mild tricuspid regurgitation. Right


ventricular systolic pressure is normal.

Aortic Valve

The aortic valve opens well. No hemodynamically significant valvular aortic stenosis. No aortic regur
gitation

is present.

Pulmonic Valve

The pulmonic valve is not well seen, but is grossly normal. There is no pulmonic valvular stenosis.

Great Vessels

The aortic root is normal size.

Pericardium/Pleura

Small to moderate pericardial effusion. There is no right ventricular diastolic collapse to suggest t
amponade.

Clinical correlation required.

______________________________________________________________________________





Interpretation Summary

Small to moderate pericardial effusion. There is no right ventricular diastolic collapse to suggest t
amponade.

Clinical correlation required.

The study was technically difficult with many images being suboptimal in quality.

Left ventricular systolic function is grossly normal.

Ejection Fraction = 50%.

Septal motion is consistent with conduction abnormality.

Right ventricular systolic pressure is normal.





MD Doug Madden 2019 11:17 AM

## 2019-11-30 LAB
ANA NUCLEOLAR TITR SER: (no result) {TITER}
ANA SPECKLED TITR SER: (no result) {TITER}

## 2019-12-03 NOTE — PATH
Cytology Non-Gynecological Report



Patient Name:  SANDER RAHMAN

Accession #:  

Med. Rec. #:  [id number]                                                        

   /Age/Gender:  1955 (Age: 64) / F

Account:  [id number]                                                          

             Location: 4  TELEMETRY U

Taken:  2019

Received:  2019

Reported:  12/3/2019

Physicians:  France Manley M.D.

  



Specimen(s) Received

 PERICARDIAL FLUID 





Clinical History

Pericardial effusion







Final Diagnosis

PERICARDIAL FLUID FOR CYTOLOGY:

SATISFACTORY FOR EVALUATION.

NEGATIVE FOR MALIGNANT CELLS.

MACROPHAGES, MESOTHELIALS, AND LYMPHOCYTES PRESENT.



Comment: Immunohistochemistry stain AE1/AE3 is negative in the aggregates of

macrophages, which supports the above diagnosis. 

Positive and negative controls (internal if applicable) show appropriate

results.











***Electronically Signed***

Cassidy Tobar M.D.





Gross Description

Approximately 10cc of bloody fluid received fresh.  One cytospin and one

cellblock prepared